# Patient Record
Sex: FEMALE | Race: OTHER | HISPANIC OR LATINO | URBAN - METROPOLITAN AREA
[De-identification: names, ages, dates, MRNs, and addresses within clinical notes are randomized per-mention and may not be internally consistent; named-entity substitution may affect disease eponyms.]

---

## 2017-07-09 ENCOUNTER — EMERGENCY (EMERGENCY)
Facility: HOSPITAL | Age: 47
LOS: 0 days | Discharge: HOME | End: 2017-07-10
Attending: EMERGENCY MEDICINE | Admitting: EMERGENCY MEDICINE
Payer: MEDICAID

## 2017-07-09 VITALS
TEMPERATURE: 98 F | SYSTOLIC BLOOD PRESSURE: 133 MMHG | DIASTOLIC BLOOD PRESSURE: 89 MMHG | OXYGEN SATURATION: 99 % | HEART RATE: 78 BPM | RESPIRATION RATE: 20 BRPM

## 2017-07-09 DIAGNOSIS — M54.5 LOW BACK PAIN: ICD-10-CM

## 2017-07-09 LAB
ALBUMIN SERPL ELPH-MCNC: 3.7 G/DL — SIGNIFICANT CHANGE UP (ref 3.3–5)
ALP SERPL-CCNC: 68 U/L — SIGNIFICANT CHANGE UP (ref 40–120)
ALT FLD-CCNC: 27 U/L — SIGNIFICANT CHANGE UP (ref 12–78)
ANION GAP SERPL CALC-SCNC: 5 MMOL/L — SIGNIFICANT CHANGE UP (ref 5–17)
APPEARANCE UR: CLEAR — SIGNIFICANT CHANGE UP
AST SERPL-CCNC: 19 U/L — SIGNIFICANT CHANGE UP (ref 15–37)
BASOPHILS # BLD AUTO: 0.1 K/UL — SIGNIFICANT CHANGE UP (ref 0–0.2)
BASOPHILS NFR BLD AUTO: 0.9 % — SIGNIFICANT CHANGE UP (ref 0–2)
BILIRUB SERPL-MCNC: 0.4 MG/DL — SIGNIFICANT CHANGE UP (ref 0.2–1.2)
BILIRUB UR-MCNC: NEGATIVE — SIGNIFICANT CHANGE UP
BUN SERPL-MCNC: 11 MG/DL — SIGNIFICANT CHANGE UP (ref 7–23)
CALCIUM SERPL-MCNC: 8.5 MG/DL — SIGNIFICANT CHANGE UP (ref 8.5–10.1)
CHLORIDE SERPL-SCNC: 103 MMOL/L — SIGNIFICANT CHANGE UP (ref 96–108)
CO2 SERPL-SCNC: 29 MMOL/L — SIGNIFICANT CHANGE UP (ref 22–31)
COLOR SPEC: YELLOW — SIGNIFICANT CHANGE UP
CREAT SERPL-MCNC: 0.78 MG/DL — SIGNIFICANT CHANGE UP (ref 0.5–1.3)
DIFF PNL FLD: (no result)
EOSINOPHIL # BLD AUTO: 0.4 K/UL — SIGNIFICANT CHANGE UP (ref 0–0.5)
EOSINOPHIL NFR BLD AUTO: 3.2 % — SIGNIFICANT CHANGE UP (ref 0–6)
EPI CELLS # UR: SIGNIFICANT CHANGE UP
GLUCOSE SERPL-MCNC: 114 MG/DL — HIGH (ref 70–99)
GLUCOSE UR QL: NEGATIVE MG/DL — SIGNIFICANT CHANGE UP
HCT VFR BLD CALC: 41 % — SIGNIFICANT CHANGE UP (ref 34.5–45)
HGB BLD-MCNC: 13.8 G/DL — SIGNIFICANT CHANGE UP (ref 11.5–15.5)
KETONES UR-MCNC: NEGATIVE — SIGNIFICANT CHANGE UP
LEUKOCYTE ESTERASE UR-ACNC: NEGATIVE — SIGNIFICANT CHANGE UP
LYMPHOCYTES # BLD AUTO: 38.2 % — SIGNIFICANT CHANGE UP (ref 13–44)
LYMPHOCYTES # BLD AUTO: 5.3 K/UL — HIGH (ref 1–3.3)
MANUAL DIF COMMENT BLD-IMP: SIGNIFICANT CHANGE UP
MCHC RBC-ENTMCNC: 29.9 PG — SIGNIFICANT CHANGE UP (ref 27–34)
MCHC RBC-ENTMCNC: 33.7 GM/DL — SIGNIFICANT CHANGE UP (ref 32–36)
MCV RBC AUTO: 88.6 FL — SIGNIFICANT CHANGE UP (ref 80–100)
MONOCYTES # BLD AUTO: 1 K/UL — HIGH (ref 0–0.9)
MONOCYTES NFR BLD AUTO: 7.2 % — SIGNIFICANT CHANGE UP (ref 2–14)
NEUTROPHILS # BLD AUTO: 7 K/UL — SIGNIFICANT CHANGE UP (ref 1.8–7.4)
NEUTROPHILS NFR BLD AUTO: 50.5 % — SIGNIFICANT CHANGE UP (ref 43–77)
NITRITE UR-MCNC: NEGATIVE — SIGNIFICANT CHANGE UP
PH UR: 6 — SIGNIFICANT CHANGE UP (ref 5–8)
PLAT MORPH BLD: NORMAL — SIGNIFICANT CHANGE UP
PLATELET # BLD AUTO: 275 K/UL — SIGNIFICANT CHANGE UP (ref 150–400)
POTASSIUM SERPL-MCNC: 4.2 MMOL/L — SIGNIFICANT CHANGE UP (ref 3.5–5.3)
POTASSIUM SERPL-SCNC: 4.2 MMOL/L — SIGNIFICANT CHANGE UP (ref 3.5–5.3)
PROT SERPL-MCNC: 7.6 GM/DL — SIGNIFICANT CHANGE UP (ref 6–8.3)
PROT UR-MCNC: NEGATIVE MG/DL — SIGNIFICANT CHANGE UP
RBC # BLD: 4.63 M/UL — SIGNIFICANT CHANGE UP (ref 3.8–5.2)
RBC # FLD: 12.5 % — SIGNIFICANT CHANGE UP (ref 10.3–14.5)
RBC BLD AUTO: NORMAL — SIGNIFICANT CHANGE UP
RBC CASTS # UR COMP ASSIST: SIGNIFICANT CHANGE UP /HPF (ref 0–4)
SODIUM SERPL-SCNC: 137 MMOL/L — SIGNIFICANT CHANGE UP (ref 135–145)
SP GR SPEC: 1.01 — SIGNIFICANT CHANGE UP (ref 1.01–1.02)
UROBILINOGEN FLD QL: NEGATIVE MG/DL — SIGNIFICANT CHANGE UP
WBC # BLD: 13.8 K/UL — HIGH (ref 3.8–10.5)
WBC # FLD AUTO: 13.8 K/UL — HIGH (ref 3.8–10.5)
WBC UR QL: SIGNIFICANT CHANGE UP

## 2017-07-09 PROCEDURE — 99285 EMERGENCY DEPT VISIT HI MDM: CPT

## 2017-07-09 PROCEDURE — 72100 X-RAY EXAM L-S SPINE 2/3 VWS: CPT | Mod: 26

## 2017-07-09 RX ORDER — KETOROLAC TROMETHAMINE 30 MG/ML
30 SYRINGE (ML) INJECTION ONCE
Qty: 0 | Refills: 0 | Status: DISCONTINUED | OUTPATIENT
Start: 2017-07-09 | End: 2017-07-09

## 2017-07-09 RX ORDER — SODIUM CHLORIDE 9 MG/ML
1000 INJECTION INTRAMUSCULAR; INTRAVENOUS; SUBCUTANEOUS ONCE
Qty: 0 | Refills: 0 | Status: COMPLETED | OUTPATIENT
Start: 2017-07-09 | End: 2017-07-09

## 2017-07-09 RX ADMIN — Medication 30 MILLIGRAM(S): at 22:22

## 2017-07-09 RX ADMIN — SODIUM CHLORIDE 1000 MILLILITER(S): 9 INJECTION INTRAMUSCULAR; INTRAVENOUS; SUBCUTANEOUS at 22:22

## 2017-07-09 NOTE — ED STATDOCS - OBJECTIVE STATEMENT
46 y/o F with Hx of hysterectomy, pyelonephritis 1 year ago presents to ED for evaluation of sudden onset lower back pain. Pt states pain makes it difficult to move. No recent trauma or exertion. No fever, chills n/v/d, dysuria.

## 2017-07-10 VITALS
SYSTOLIC BLOOD PRESSURE: 117 MMHG | OXYGEN SATURATION: 99 % | DIASTOLIC BLOOD PRESSURE: 83 MMHG | HEART RATE: 62 BPM | RESPIRATION RATE: 17 BRPM

## 2017-07-10 DIAGNOSIS — Z90.710 ACQUIRED ABSENCE OF BOTH CERVIX AND UTERUS: Chronic | ICD-10-CM

## 2017-07-10 PROCEDURE — 74176 CT ABD & PELVIS W/O CONTRAST: CPT | Mod: 26

## 2017-07-10 NOTE — ED ADULT NURSE NOTE - CHIEF COMPLAINT QUOTE
back pain that started at 12:00. Denies injury or trauma. Initially reporting chest pain, but denies pain now. States that pain is so bad it is 'hard to breathe." No respiratory distress evident.

## 2017-07-10 NOTE — ED ADULT NURSE NOTE - ED STAT RN HANDOFF DETAILS
Received report from JOANN Gallegos and reassessed patient. Agree with the previous RN assessment. Patient is awaiting CT scan results. Patient is sleeping, no change in status/condition at this time. Will continue to monitor/reassess.

## 2017-07-11 LAB
CULTURE RESULTS: SIGNIFICANT CHANGE UP
SPECIMEN SOURCE: SIGNIFICANT CHANGE UP

## 2017-11-25 ENCOUNTER — EMERGENCY (EMERGENCY)
Facility: HOSPITAL | Age: 47
LOS: 0 days | Discharge: HOME | End: 2017-11-26
Attending: EMERGENCY MEDICINE | Admitting: EMERGENCY MEDICINE
Payer: MEDICAID

## 2017-11-25 VITALS — WEIGHT: 179.9 LBS | HEIGHT: 63 IN

## 2017-11-25 DIAGNOSIS — Z90.710 ACQUIRED ABSENCE OF BOTH CERVIX AND UTERUS: Chronic | ICD-10-CM

## 2017-11-25 PROCEDURE — 99283 EMERGENCY DEPT VISIT LOW MDM: CPT | Mod: 25

## 2017-11-26 VITALS
TEMPERATURE: 99 F | DIASTOLIC BLOOD PRESSURE: 72 MMHG | OXYGEN SATURATION: 99 % | SYSTOLIC BLOOD PRESSURE: 115 MMHG | HEART RATE: 80 BPM

## 2017-11-26 LAB
APPEARANCE UR: (no result)
BACTERIA # UR AUTO: (no result)
BILIRUB UR-MCNC: NEGATIVE — SIGNIFICANT CHANGE UP
COLOR SPEC: YELLOW — SIGNIFICANT CHANGE UP
DIFF PNL FLD: (no result)
EPI CELLS # UR: SIGNIFICANT CHANGE UP
GLUCOSE UR QL: NEGATIVE MG/DL — SIGNIFICANT CHANGE UP
KETONES UR-MCNC: NEGATIVE — SIGNIFICANT CHANGE UP
LEUKOCYTE ESTERASE UR-ACNC: (no result)
NITRITE UR-MCNC: NEGATIVE — SIGNIFICANT CHANGE UP
PH UR: 7 — SIGNIFICANT CHANGE UP (ref 5–8)
PROT UR-MCNC: 15 MG/DL
RBC CASTS # UR COMP ASSIST: SIGNIFICANT CHANGE UP /HPF (ref 0–4)
SP GR SPEC: 1.01 — SIGNIFICANT CHANGE UP (ref 1.01–1.02)
UROBILINOGEN FLD QL: 1 MG/DL
WBC UR QL: SIGNIFICANT CHANGE UP

## 2017-11-26 RX ORDER — KETOROLAC TROMETHAMINE 30 MG/ML
30 SYRINGE (ML) INJECTION ONCE
Qty: 0 | Refills: 0 | Status: DISCONTINUED | OUTPATIENT
Start: 2017-11-26 | End: 2017-11-26

## 2017-11-26 RX ADMIN — Medication 30 MILLIGRAM(S): at 02:12

## 2017-11-26 NOTE — ED PROVIDER NOTE - MUSCULOSKELETAL, MLM
No midline spine tenderness.  Right lower back/ SI tender.  FROM but with pain to range RLE.  Neg SLR.  Ambulatory but difficulty standing up all the way secondary to pain.  Strength 5/5.  Sensation intact

## 2017-11-26 NOTE — ED PROVIDER NOTE - OBJECTIVE STATEMENT
History obtained via  [id number]  46 yo female c/o low back pain. Pain is right sided, mostly upper buttock with occasional radiation down the right leg.  Pain started around 10am after bending over and having difficulty standing back up.  Took two tylenol at 10am without relief.  Noticed the numbness a couple of hours later, pins/needles in the leg, now resolved.  No fever. No abdominal pain. No weakness.  +difficulty walking secondary to pain.  No bowel/urinary complaints.  Had a similar episode over the summer, not as bad, resolved on its own.  No pmh.  PMD Griselda

## 2017-11-26 NOTE — ED PROVIDER NOTE - CONSTITUTIONAL, MLM
normal... Well appearing, well nourished, awake, alert, oriented to person, place, time/situation and in no apparent distress at rest, moderately uncomfortable with movement/standing

## 2017-11-26 NOTE — ED ADULT NURSE NOTE - OBJECTIVE STATEMENT
Pt presents to ER c/o right sided back pain radiating down RLE. Pt reports onset of symptoms began at 10 am yesterday while walking and became exacerbated at night. Pt reports hx of back pain. Pt reports not being able to walk up stairs. Denies fall/trauma. Skin is intact, no ecchymosis/erythremia. Pt reports pain shooting down RLE with ambulation. AO x 3 oriented to baseline, normal breathing pattern with no difficulty

## 2017-11-27 DIAGNOSIS — M54.5 LOW BACK PAIN: ICD-10-CM

## 2018-09-21 ENCOUNTER — EMERGENCY (EMERGENCY)
Facility: HOSPITAL | Age: 48
LOS: 0 days | Discharge: HOME | End: 2018-09-21
Attending: EMERGENCY MEDICINE | Admitting: EMERGENCY MEDICINE
Payer: MEDICAID

## 2018-09-21 VITALS
DIASTOLIC BLOOD PRESSURE: 83 MMHG | TEMPERATURE: 99 F | SYSTOLIC BLOOD PRESSURE: 117 MMHG | RESPIRATION RATE: 15 BRPM | HEART RATE: 75 BPM | OXYGEN SATURATION: 98 %

## 2018-09-21 VITALS — WEIGHT: 199.08 LBS | HEIGHT: 63 IN

## 2018-09-21 DIAGNOSIS — M25.512 PAIN IN LEFT SHOULDER: ICD-10-CM

## 2018-09-21 DIAGNOSIS — M79.622 PAIN IN LEFT UPPER ARM: ICD-10-CM

## 2018-09-21 DIAGNOSIS — Z90.710 ACQUIRED ABSENCE OF BOTH CERVIX AND UTERUS: Chronic | ICD-10-CM

## 2018-09-21 PROCEDURE — 99283 EMERGENCY DEPT VISIT LOW MDM: CPT

## 2018-09-21 RX ORDER — IBUPROFEN 200 MG
600 TABLET ORAL ONCE
Qty: 0 | Refills: 0 | Status: COMPLETED | OUTPATIENT
Start: 2018-09-21 | End: 2018-09-21

## 2018-09-21 RX ORDER — IBUPROFEN 200 MG
1 TABLET ORAL
Qty: 20 | Refills: 0
Start: 2018-09-21 | End: 2018-09-25

## 2018-09-21 RX ORDER — LIDOCAINE 4 G/100G
1 CREAM TOPICAL
Qty: 5 | Refills: 0
Start: 2018-09-21 | End: 2018-09-25

## 2018-09-21 NOTE — ED STATDOCS - CONSTITUTIONAL, MLM
Bronchospasm (Child)    When your child breathes, the air goes down his or her main windpipe (trachea) and through the bronchi into the lungs. The bronchi are the 2 tubes that lead from the trachea to the left and right lungs. If the bronchi get irritated and inflamed, they can narrow. This is because the muscles around the air passages in the lung go into spasm. This makes it hard to breathe. This condition is called bronchospasm.  Bronchospasm can be caused by many things. These include allergies, asthma, a respiratory infection, or reaction to a medication.  Bronchospasm makes it hard to breathe out. It causes wheezing when exhaling. Wheezing is a whistling sound caused by breathing through narrowed airways. Bronchospasm can also cause frequent coughing without the wheezing sound. A child with bronchospasm may cough, wheeze, or be short of breath. The inflamed area creates mucus. The mucus can partially block the airways. The chest muscles can tighten. The child can also have a fever.  A child with bronchospasm may be given medicine to take at home. A child with severe bronchospasm may need to stay in the hospital for 1 or more nights. He or she is given intravenous (IV) fluids and oxygen.  Children with asthma often get bronchospasm. But not all children with bronchospasm have asthma. If a child has repeated bouts of bronchospasm, then he or she may need to be tested for asthma.  Home care  Follow these guidelines when caring for your child at home:  · Your child's health care provider may prescribe medicines. Follow all instructions for giving these to your child. Don’t give your child any medicines that have not been approved by the provider. Your child may be prescribed bronchodilator medicine. This is to help with breathing. It may come as a spray, inhaler, or pill to take by mouth. Make sure your child uses the medicine exactly at the times advised. Follow all instructions for giving these medicines to  your child.  · Don’t give a child under age 6 cough or cold medicine unless the health care provider tells you to do so.  · Know the warning signs of a bronchospasm attack. These include irritability, restless sleep, fever, and cough. Your child may have no interest in feeding. Learn what medicines to give if you see these signs.  · Wash your hands well with soap and warm water before and after caring for your child. This is to help prevent spreading infection.  · Give your child plenty of time to rest. Have your child sleep in a slightly upright position. This is to help make breathing easier. If possible, raise the head of the mattress a few inches. Or prop your child’s body up with pillows. Don’t put pillows or other soft objects in the crib of babies under 12 months of age.  · To prevent dehydration and help loosen lung mucus in toddlers and older children, make sure your child drinks plenty of liquids. Children may prefer cold drinks, frozen desserts, or popsicles. They may also like warm chicken soup or drinks with lemon and honey. Don’t give honey to a child younger than 1 year old.  ·  To prevent dehydration and help loosen lung mucus in babies, make sure your child drinks plenty of liquids. Use a medicine dropper, if needed, to give small amounts of breast milk, formula, or clear liquids to your baby. Give 1 to 2 teaspoons every 10 to 15 minutes. A baby may only be able to feed for short amounts of time. If you are breastfeeding, pump and store milk for later use. Give your child oral rehydration solution between feedings. These are available from the drug store.  · Don’t smoke around your child. Tobacco smoke can make your child’s symptoms worse.  Follow-up care   Follow up with your child’s health care provider.  Special note to parents  Don’t give cough and cold medicines to any child under age 6. These have been shown to not help young children, and may cause serious side effects.  When to seek medical  advice  Call your child's health care provider right away if any of these occur:  · Fever of 100.4°F (38°C) or higher  · No improvement within 24 hours of treatment  · Symptoms that don’t get better, or get worse  · Cough with lots of thick colored mucus  · Trouble breathing that doesn’t get better, or gets worse  · Fast breathing  · Loss of appetite or poor feeding  · Signs of dehydration, such as dry mouth, crying with no tears, or urinating less than normal  · More medicine than prescribed is needed to help relieve wheezing  · The medicine doesn’t relieve wheezing  © 9279-7983 [employer]. [address] [place] [address], [place] [address]. All rights reserved. This information is not intended as a substitute for professional medical care. Always follow your healthcare professional's instructions.         normal... well appearing, well nourished, and in no apparent distress.

## 2018-09-21 NOTE — ED STATDOCS - OBJECTIVE STATEMENT
47 y/o female with a PMHx of Pyelonephritis presents to the ED c/o left arm pain x 4 days. Pt has difficulty lifting the arm due to pain. Has not taken any medication for the pain. PCP: Dr. Olivo

## 2018-09-21 NOTE — ED STATDOCS - ATTENDING CONTRIBUTION TO CARE
I, Dillan Zapata, performed the initial face to face bedside interview with this patient regarding history of present illness, review of symptoms and relevant past medical, social and family history.  I completed an independent physical examination.  I was the initial provider who evaluated this patient.  The history, relevant review of systems, past medical and surgical history, medical decision making, and physical examination was documented by the scribe in my presence and I attest to the accuracy of the documentation.  I have signed out the follow up of any pending tests (i.e. labs, radiological studies) to the ACP.  I have communicated the patient’s plan of care and disposition with the ACP.

## 2018-09-21 NOTE — ED ADULT TRIAGE NOTE - CHIEF COMPLAINT QUOTE
Patient comes to ED for left sided arm, breast and back pain for 4 days. Pt denies any injury. pt reports pain worsening today and difficulty moving arm

## 2018-09-22 PROBLEM — N12 TUBULO-INTERSTITIAL NEPHRITIS, NOT SPECIFIED AS ACUTE OR CHRONIC: Chronic | Status: ACTIVE | Noted: 2017-07-10

## 2019-03-13 ENCOUNTER — APPOINTMENT (OUTPATIENT)
Age: 49
End: 2019-03-13
Payer: MEDICAID

## 2019-03-13 ENCOUNTER — ASOB RESULT (OUTPATIENT)
Age: 49
End: 2019-03-13

## 2019-03-13 PROCEDURE — 76857 US EXAM PELVIC LIMITED: CPT | Mod: 59

## 2019-03-13 PROCEDURE — 76830 TRANSVAGINAL US NON-OB: CPT

## 2019-06-10 ENCOUNTER — INPATIENT (INPATIENT)
Facility: HOSPITAL | Age: 49
LOS: 0 days | Discharge: HOME | End: 2019-06-11
Attending: FAMILY MEDICINE | Admitting: FAMILY MEDICINE
Payer: MEDICAID

## 2019-06-10 VITALS — HEIGHT: 62 IN | WEIGHT: 186.07 LBS

## 2019-06-10 DIAGNOSIS — Z90.710 ACQUIRED ABSENCE OF BOTH CERVIX AND UTERUS: Chronic | ICD-10-CM

## 2019-06-10 LAB
ALBUMIN SERPL ELPH-MCNC: 3.8 G/DL — SIGNIFICANT CHANGE UP (ref 3.3–5)
ALP SERPL-CCNC: 69 U/L — SIGNIFICANT CHANGE UP (ref 40–120)
ALT FLD-CCNC: 36 U/L — SIGNIFICANT CHANGE UP (ref 12–78)
ANION GAP SERPL CALC-SCNC: 5 MMOL/L — SIGNIFICANT CHANGE UP (ref 5–17)
APPEARANCE UR: CLEAR — SIGNIFICANT CHANGE UP
APTT BLD: 33.2 SEC — SIGNIFICANT CHANGE UP (ref 27.5–36.3)
AST SERPL-CCNC: 29 U/L — SIGNIFICANT CHANGE UP (ref 15–37)
BACTERIA # UR AUTO: ABNORMAL
BILIRUB SERPL-MCNC: 0.4 MG/DL — SIGNIFICANT CHANGE UP (ref 0.2–1.2)
BILIRUB UR-MCNC: NEGATIVE — SIGNIFICANT CHANGE UP
BUN SERPL-MCNC: 13 MG/DL — SIGNIFICANT CHANGE UP (ref 7–23)
CALCIUM SERPL-MCNC: 9.3 MG/DL — SIGNIFICANT CHANGE UP (ref 8.5–10.1)
CHLORIDE SERPL-SCNC: 103 MMOL/L — SIGNIFICANT CHANGE UP (ref 96–108)
CO2 SERPL-SCNC: 28 MMOL/L — SIGNIFICANT CHANGE UP (ref 22–31)
COLOR SPEC: YELLOW — SIGNIFICANT CHANGE UP
CREAT SERPL-MCNC: 0.79 MG/DL — SIGNIFICANT CHANGE UP (ref 0.5–1.3)
DIFF PNL FLD: ABNORMAL
EPI CELLS # UR: SIGNIFICANT CHANGE UP
GLUCOSE SERPL-MCNC: 123 MG/DL — HIGH (ref 70–99)
GLUCOSE UR QL: NEGATIVE MG/DL — SIGNIFICANT CHANGE UP
HCT VFR BLD CALC: 41.1 % — SIGNIFICANT CHANGE UP (ref 34.5–45)
HGB BLD-MCNC: 13.6 G/DL — SIGNIFICANT CHANGE UP (ref 11.5–15.5)
INR BLD: 1.08 RATIO — SIGNIFICANT CHANGE UP (ref 0.88–1.16)
KETONES UR-MCNC: NEGATIVE — SIGNIFICANT CHANGE UP
LEUKOCYTE ESTERASE UR-ACNC: NEGATIVE — SIGNIFICANT CHANGE UP
LIDOCAIN IGE QN: 229 U/L — SIGNIFICANT CHANGE UP (ref 73–393)
MCHC RBC-ENTMCNC: 29 PG — SIGNIFICANT CHANGE UP (ref 27–34)
MCHC RBC-ENTMCNC: 33.1 GM/DL — SIGNIFICANT CHANGE UP (ref 32–36)
MCV RBC AUTO: 87.6 FL — SIGNIFICANT CHANGE UP (ref 80–100)
NITRITE UR-MCNC: NEGATIVE — SIGNIFICANT CHANGE UP
PH UR: 6 — SIGNIFICANT CHANGE UP (ref 5–8)
PLATELET # BLD AUTO: 238 K/UL — SIGNIFICANT CHANGE UP (ref 150–400)
POTASSIUM SERPL-MCNC: 3.9 MMOL/L — SIGNIFICANT CHANGE UP (ref 3.5–5.3)
POTASSIUM SERPL-SCNC: 3.9 MMOL/L — SIGNIFICANT CHANGE UP (ref 3.5–5.3)
PROT SERPL-MCNC: 8 GM/DL — SIGNIFICANT CHANGE UP (ref 6–8.3)
PROT UR-MCNC: NEGATIVE MG/DL — SIGNIFICANT CHANGE UP
PROTHROM AB SERPL-ACNC: 12 SEC — SIGNIFICANT CHANGE UP (ref 10–12.9)
RBC # BLD: 4.69 M/UL — SIGNIFICANT CHANGE UP (ref 3.8–5.2)
RBC # FLD: 12.3 % — SIGNIFICANT CHANGE UP (ref 10.3–14.5)
RBC CASTS # UR COMP ASSIST: ABNORMAL /HPF (ref 0–4)
SODIUM SERPL-SCNC: 136 MMOL/L — SIGNIFICANT CHANGE UP (ref 135–145)
SP GR SPEC: 1.01 — SIGNIFICANT CHANGE UP (ref 1.01–1.02)
TROPONIN I SERPL-MCNC: <0.015 NG/ML — SIGNIFICANT CHANGE UP (ref 0.01–0.04)
UROBILINOGEN FLD QL: NEGATIVE MG/DL — SIGNIFICANT CHANGE UP
WBC # BLD: 11.69 K/UL — HIGH (ref 3.8–10.5)
WBC # FLD AUTO: 11.69 K/UL — HIGH (ref 3.8–10.5)
WBC UR QL: SIGNIFICANT CHANGE UP

## 2019-06-10 PROCEDURE — 71045 X-RAY EXAM CHEST 1 VIEW: CPT | Mod: 26

## 2019-06-10 PROCEDURE — 99285 EMERGENCY DEPT VISIT HI MDM: CPT | Mod: 25

## 2019-06-10 PROCEDURE — 93010 ELECTROCARDIOGRAM REPORT: CPT | Mod: 76

## 2019-06-10 RX ORDER — ASPIRIN/CALCIUM CARB/MAGNESIUM 324 MG
325 TABLET ORAL ONCE
Refills: 0 | Status: COMPLETED | OUTPATIENT
Start: 2019-06-10 | End: 2019-06-10

## 2019-06-10 RX ADMIN — Medication 325 MILLIGRAM(S): at 23:16

## 2019-06-10 NOTE — ED ADULT TRIAGE NOTE - CHIEF COMPLAINT QUOTE
Complaining of chest pain. Saw PMD last Thursday and was placed on halter monitor x1day. States after that she was referred to a cardiologist for abnormal EKG, has an appointment this week. States tonight she began having CP again. Denies any SOB. Ambulated into ED with no distress noted at triage.

## 2019-06-10 NOTE — ED PROVIDER NOTE - OBJECTIVE STATEMENT
50 y/o female with a PMHx of pyelonephritis presents to the ED c/o chest pain x2 days. +difficulty breathing. +fatigue. Pt was seen by PMD for checkup last week, wore Holter monitor for 24 hours showed 4 episodes of ST depressions, multiple pauses, SVT. Pt is supposed to follow up with cardiology but has not seen cardio yet. PMD- Dr. Villalobos. 50 y/o female with a PMHx of pyelonephritis presents to the ED c/o chest pain x2 days. +difficulty breathing. +fatigue. Pt was seen by PMD for checkup last week, wore Holter monitor for 24 hours showed 4 episodes of ST depressions, multiple pauses, SVT. Pt is supposed to follow up with cardiology but has not seen cardio yet. pt denies any fever, HA, n/v/d/abd pain.   states no meds taken for pain.   no sick contacts or recent travel.  PMD- Dr. Villalobos.

## 2019-06-10 NOTE — ED PROVIDER NOTE - CHPI ED SYMPTOMS POS
+difficulty breathing, fatigue/CHEST PAIN +difficulty breathing, fatigue/SHORTNESS OF BREATH/CHEST PAIN

## 2019-06-10 NOTE — ED PROVIDER NOTE - PROGRESS NOTE DETAILS
results noted.   pt with abnormal Holter.   will admit for further ACS w/u.  case d/w Allie and will admit

## 2019-06-11 ENCOUNTER — TRANSCRIPTION ENCOUNTER (OUTPATIENT)
Age: 49
End: 2019-06-11

## 2019-06-11 VITALS
SYSTOLIC BLOOD PRESSURE: 94 MMHG | HEART RATE: 69 BPM | TEMPERATURE: 98 F | OXYGEN SATURATION: 95 % | RESPIRATION RATE: 18 BRPM | DIASTOLIC BLOOD PRESSURE: 64 MMHG

## 2019-06-11 LAB
ALBUMIN SERPL ELPH-MCNC: 3.6 G/DL — SIGNIFICANT CHANGE UP (ref 3.3–5)
ALP SERPL-CCNC: 69 U/L — SIGNIFICANT CHANGE UP (ref 40–120)
ALT FLD-CCNC: 36 U/L — SIGNIFICANT CHANGE UP (ref 12–78)
ANION GAP SERPL CALC-SCNC: 9 MMOL/L — SIGNIFICANT CHANGE UP (ref 5–17)
AST SERPL-CCNC: 25 U/L — SIGNIFICANT CHANGE UP (ref 15–37)
BASOPHILS # BLD AUTO: 0.05 K/UL — SIGNIFICANT CHANGE UP (ref 0–0.2)
BASOPHILS NFR BLD AUTO: 0.5 % — SIGNIFICANT CHANGE UP (ref 0–2)
BILIRUB SERPL-MCNC: 0.6 MG/DL — SIGNIFICANT CHANGE UP (ref 0.2–1.2)
BUN SERPL-MCNC: 9 MG/DL — SIGNIFICANT CHANGE UP (ref 7–23)
CALCIUM SERPL-MCNC: 8.8 MG/DL — SIGNIFICANT CHANGE UP (ref 8.5–10.1)
CHLORIDE SERPL-SCNC: 105 MMOL/L — SIGNIFICANT CHANGE UP (ref 96–108)
CHOLEST SERPL-MCNC: 142 MG/DL — SIGNIFICANT CHANGE UP (ref 10–199)
CO2 SERPL-SCNC: 26 MMOL/L — SIGNIFICANT CHANGE UP (ref 22–31)
CREAT SERPL-MCNC: 0.68 MG/DL — SIGNIFICANT CHANGE UP (ref 0.5–1.3)
D DIMER BLD IA.RAPID-MCNC: <150 NG/ML DDU — SIGNIFICANT CHANGE UP
EOSINOPHIL # BLD AUTO: 0.6 K/UL — HIGH (ref 0–0.5)
EOSINOPHIL NFR BLD AUTO: 6.4 % — HIGH (ref 0–6)
GLUCOSE BLDC GLUCOMTR-MCNC: 110 MG/DL — HIGH (ref 70–99)
GLUCOSE BLDC GLUCOMTR-MCNC: 120 MG/DL — HIGH (ref 70–99)
GLUCOSE SERPL-MCNC: 109 MG/DL — HIGH (ref 70–99)
HBA1C BLD-MCNC: 6.4 % — HIGH (ref 4–5.6)
HCT VFR BLD CALC: 40.1 % — SIGNIFICANT CHANGE UP (ref 34.5–45)
HDLC SERPL-MCNC: 43 MG/DL — LOW
HGB BLD-MCNC: 13.2 G/DL — SIGNIFICANT CHANGE UP (ref 11.5–15.5)
IMM GRANULOCYTES NFR BLD AUTO: 0.3 % — SIGNIFICANT CHANGE UP (ref 0–1.5)
LIPID PNL WITH DIRECT LDL SERPL: 71 MG/DL — SIGNIFICANT CHANGE UP
LYMPHOCYTES # BLD AUTO: 4.08 K/UL — HIGH (ref 1–3.3)
LYMPHOCYTES # BLD AUTO: 43.5 % — SIGNIFICANT CHANGE UP (ref 13–44)
MCHC RBC-ENTMCNC: 29.3 PG — SIGNIFICANT CHANGE UP (ref 27–34)
MCHC RBC-ENTMCNC: 32.9 GM/DL — SIGNIFICANT CHANGE UP (ref 32–36)
MCV RBC AUTO: 89.1 FL — SIGNIFICANT CHANGE UP (ref 80–100)
MONOCYTES # BLD AUTO: 0.78 K/UL — SIGNIFICANT CHANGE UP (ref 0–0.9)
MONOCYTES NFR BLD AUTO: 8.3 % — SIGNIFICANT CHANGE UP (ref 2–14)
NEUTROPHILS # BLD AUTO: 3.85 K/UL — SIGNIFICANT CHANGE UP (ref 1.8–7.4)
NEUTROPHILS NFR BLD AUTO: 41 % — LOW (ref 43–77)
PLATELET # BLD AUTO: 229 K/UL — SIGNIFICANT CHANGE UP (ref 150–400)
POTASSIUM SERPL-MCNC: 3.8 MMOL/L — SIGNIFICANT CHANGE UP (ref 3.5–5.3)
POTASSIUM SERPL-SCNC: 3.8 MMOL/L — SIGNIFICANT CHANGE UP (ref 3.5–5.3)
PROT SERPL-MCNC: 7.4 GM/DL — SIGNIFICANT CHANGE UP (ref 6–8.3)
RBC # BLD: 4.5 M/UL — SIGNIFICANT CHANGE UP (ref 3.8–5.2)
RBC # FLD: 12.4 % — SIGNIFICANT CHANGE UP (ref 10.3–14.5)
SODIUM SERPL-SCNC: 140 MMOL/L — SIGNIFICANT CHANGE UP (ref 135–145)
TOTAL CHOLESTEROL/HDL RATIO MEASUREMENT: 3.3 RATIO — SIGNIFICANT CHANGE UP (ref 3.3–7.1)
TRIGL SERPL-MCNC: 141 MG/DL — SIGNIFICANT CHANGE UP (ref 10–149)
TROPONIN I SERPL-MCNC: <0.015 NG/ML — SIGNIFICANT CHANGE UP (ref 0.01–0.04)
TROPONIN I SERPL-MCNC: <0.015 NG/ML — SIGNIFICANT CHANGE UP (ref 0.01–0.04)
WBC # BLD: 9.39 K/UL — SIGNIFICANT CHANGE UP (ref 3.8–10.5)
WBC # FLD AUTO: 9.39 K/UL — SIGNIFICANT CHANGE UP (ref 3.8–10.5)

## 2019-06-11 PROCEDURE — 93306 TTE W/DOPPLER COMPLETE: CPT | Mod: 26

## 2019-06-11 RX ORDER — DEXTROSE 50 % IN WATER 50 %
25 SYRINGE (ML) INTRAVENOUS ONCE
Refills: 0 | Status: DISCONTINUED | OUTPATIENT
Start: 2019-06-11 | End: 2019-06-11

## 2019-06-11 RX ORDER — GLUCAGON INJECTION, SOLUTION 0.5 MG/.1ML
1 INJECTION, SOLUTION SUBCUTANEOUS ONCE
Refills: 0 | Status: DISCONTINUED | OUTPATIENT
Start: 2019-06-11 | End: 2019-06-11

## 2019-06-11 RX ORDER — ASPIRIN/CALCIUM CARB/MAGNESIUM 324 MG
1 TABLET ORAL
Qty: 30 | Refills: 0
Start: 2019-06-11 | End: 2019-07-10

## 2019-06-11 RX ORDER — INSULIN LISPRO 100/ML
VIAL (ML) SUBCUTANEOUS
Refills: 0 | Status: DISCONTINUED | OUTPATIENT
Start: 2019-06-11 | End: 2019-06-11

## 2019-06-11 RX ORDER — METOPROLOL TARTRATE 50 MG
1 TABLET ORAL
Qty: 30 | Refills: 0
Start: 2019-06-11 | End: 2019-07-10

## 2019-06-11 RX ORDER — METFORMIN HYDROCHLORIDE 850 MG/1
1 TABLET ORAL
Qty: 0 | Refills: 0 | DISCHARGE

## 2019-06-11 RX ORDER — SODIUM CHLORIDE 9 MG/ML
1000 INJECTION, SOLUTION INTRAVENOUS
Refills: 0 | Status: DISCONTINUED | OUTPATIENT
Start: 2019-06-11 | End: 2019-06-11

## 2019-06-11 RX ORDER — DEXTROSE 50 % IN WATER 50 %
12.5 SYRINGE (ML) INTRAVENOUS ONCE
Refills: 0 | Status: DISCONTINUED | OUTPATIENT
Start: 2019-06-11 | End: 2019-06-11

## 2019-06-11 RX ORDER — ATORVASTATIN CALCIUM 80 MG/1
1 TABLET, FILM COATED ORAL
Qty: 30 | Refills: 0
Start: 2019-06-11 | End: 2019-07-10

## 2019-06-11 RX ORDER — DEXTROSE 50 % IN WATER 50 %
15 SYRINGE (ML) INTRAVENOUS ONCE
Refills: 0 | Status: DISCONTINUED | OUTPATIENT
Start: 2019-06-11 | End: 2019-06-11

## 2019-06-11 RX ORDER — ASPIRIN/CALCIUM CARB/MAGNESIUM 324 MG
81 TABLET ORAL DAILY
Refills: 0 | Status: DISCONTINUED | OUTPATIENT
Start: 2019-06-11 | End: 2019-06-11

## 2019-06-11 RX ADMIN — Medication 81 MILLIGRAM(S): at 11:52

## 2019-06-11 NOTE — CONSULT NOTE ADULT - SUBJECTIVE AND OBJECTIVE BOX
Chief complaint of CP (2019 02:00)      HPI:  48 y/o F w/ PMH of DM2, p/w chest pain. CP started about 2 days ago, L sided and non-radiating. CP is associated with SOB and a feeling of throat closing. CP is sharp and is associated with nausea. Denies vomiting, cough, runny nose, fever, chills, abdominal pain. Patient had holter monitor placed recently, and has report with her. Report states patient has ventricular and supraventricular ectopics, pauses, ST depression etc.     PSH:     Social Hx: Denies x 3     Family Hx: Denies (2019 02:00)      PAST MEDICAL & SURGICAL HISTORY:  Pyelonephritis  History of hysterectomy      PREVIOUS CARDIAC WORKUP:      Echo:  Stress Test:  Cardiac Cath:    ALLERGIES:    No Known Allergies       MEDICATIONS  (STANDING):  aspirin  chewable 81 milliGRAM(s) Oral daily  dextrose 5%. 1000 milliLiter(s) (50 mL/Hr) IV Continuous <Continuous>  dextrose 50% Injectable 12.5 Gram(s) IV Push once  dextrose 50% Injectable 25 Gram(s) IV Push once  dextrose 50% Injectable 25 Gram(s) IV Push once  insulin lispro (HumaLOG) corrective regimen sliding scale   SubCutaneous three times a day before meals    MEDICATIONS  (PRN):  dextrose 40% Gel 15 Gram(s) Oral once PRN Blood Glucose LESS THAN 70 milliGRAM(s)/deciliter  glucagon  Injectable 1 milliGRAM(s) IntraMuscular once PRN Glucose LESS THAN 70 milligrams/deciliter      FAMILY HISTORY:  No pertinent family history in first degree relatives        SOCIAL HISTORY:  .scl     ROS:     .ros    Vital Signs Last 24 Hrs  T(C): 36.1 (2019 06:02), Max: 36.6 (10 Ricardo 2019 22:16)  T(F): 97 (2019 06:02), Max: 97.8 (10 Ricardo 2019 22:16)  HR: 74 (2019 06:02) (59 - 74)  BP: 125/72 (2019 06:02) (111/75 - 125/72)  BP(mean): --  RR: 18 (2019 06:02) (17 - 19)  SpO2: 98% (2019 06:02) (95% - 99%)    I&O's Summary      PHYSICAL EXAM:    .phy      TELEMETRY:    ECG:    LABS:                          13.2   9.39  )-----------( 229      ( 2019 07:17 )             40.1         140  |  105  |  9   ----------------------------<  109<H>  3.8   |  26  |  0.68    Ca    8.8      2019 07:17    TPro  7.4  /  Alb  3.6  /  TBili  0.6  /  DBili  x   /  AST  25  /  ALT  36  /  AlkPhos  69   @ 07:17  Trop-I  <0.015     @ 02:06  Trop-I  <0.015    06-10 @ 22:38  Trop-I  <0.015      D Dimer  <150  @ 07:17    PT/INR - ( 10 Ricardo 2019 22:38 )   PT: 12.0 sec;   INR: 1.08 ratio         PTT - ( 10 Ricardo 2019 22:38 )  PTT:33.2 sec      RADIOLOGY & ADDITIONAL STUDIES: Chief complaint of CP (11 Jun 2019 02:00)      HPI:  49-year-old female admitted with complaints of left-sided non-radiating chest pain. Also complains of associated shortness of breath on exertion. Pain is associated with nausea. No complains of dyspepsia or dysphagia.       PAST MEDICAL & SURGICAL HISTORY:  Pyelonephritis  History of hysterectomy  DM        PREVIOUS CARDIAC WORKUP:  Holter monitor - PACs, PVCs, occasional short pauses    Echo Today:  Nml EF, Subaortic membrane. LVOT gradient., mild MR, moderate TR      ALLERGIES:    No Known Allergies       MEDICATIONS  (STANDING):  aspirin  chewable 81 milliGRAM(s) Oral daily  dextrose 5%. 1000 milliLiter(s) (50 mL/Hr) IV Continuous <Continuous>  dextrose 50% Injectable 12.5 Gram(s) IV Push once  dextrose 50% Injectable 25 Gram(s) IV Push once  dextrose 50% Injectable 25 Gram(s) IV Push once  insulin lispro (HumaLOG) corrective regimen sliding scale   SubCutaneous three times a day before meals    MEDICATIONS  (PRN):  dextrose 40% Gel 15 Gram(s) Oral once PRN Blood Glucose LESS THAN 70 milliGRAM(s)/deciliter  glucagon  Injectable 1 milliGRAM(s) IntraMuscular once PRN Glucose LESS THAN 70 milligrams/deciliter      FAMILY HISTORY:  No pertinent family history in first degree relatives        SOCIAL HISTORY:  Nonsmoker. No ETOH abuse. No illicit drugs.     ROS:     Detailed ten system ROS was performed and was negative except for history as eluded to above.    no fever  no chills  no nausea  no vomiting  no diarrhea  no constipation  no melena  no hematochezia  no chest pain - resolved  no palpitations  no sob at rest  + dyspnea on exertion  no cough  no wheezing  no anorexia  no headache  no dizziness  no syncope  no weakness  no myalgia  no dysuria  no polyuria  no hematuria     Vital Signs Last 24 Hrs  T(C): 36.1 (11 Jun 2019 06:02), Max: 36.6 (10 Ricardo 2019 22:16)  T(F): 97 (11 Jun 2019 06:02), Max: 97.8 (10 Ricardo 2019 22:16)  HR: 74 (11 Jun 2019 06:02) (59 - 74)  BP: 125/72 (11 Jun 2019 06:02) (111/75 - 125/72)  RR: 18 (11 Jun 2019 06:02) (17 - 19)  SpO2: 98% (11 Jun 2019 06:02) (95% - 99%)      PHYSICAL EXAM:    General:                Comfortable, AAO X 3, in no distress. Obese  HEENT:                  Atraumatic, PERRLA, EOMI, conjunctiva clear.   Neck:                     Supple, no adenopathy, no thyromegaly, no JVD, no bruit.  Back:                     Symmetric, non tender.  Chest:                    Clear, B/L symmetric air entry, no tachypnea  Heart:                     S1, S2 normal, no gallop, + murmur.  Abdomen:              Soft, non-tender, bowel sounds active. No palpable masses.  Extremities:           no cyanosis, no edema. Peripheral pulses normal.  Skin:                      Skin color, texture normal. No rashes.  Neurologic:            Grossly nonfocal.       TELEMETRY:  Normal sinus rhythm with no tachy or carlene events     ECG: NSR, no ST T changes of ischemia or infarction.     LABS:                          13.2   9.39  )-----------( 229      ( 11 Jun 2019 07:17 )             40.1     06-11    140  |  105  |  9   ----------------------------<  109<H>  3.8   |  26  |  0.68    Ca    8.8      11 Jun 2019 07:17    TPro  7.4  /  Alb  3.6  /  TBili  0.6  /  DBili  x   /  AST  25  /  ALT  36  /  AlkPhos  69  06-11      06-11 @ 07:17  Trop-I  <0.015    06-11 @ 02:06  Trop-I  <0.015    06-10 @ 22:38  Trop-I  <0.015      D Dimer  <150 06-11 @ 07:17    PT/INR - ( 10 Ricardo 2019 22:38 )   PT: 12.0 sec;   INR: 1.08 ratio    PTT - ( 10 Ricardo 2019 22:38 )  PTT:33.2 sec    RADIOLOGY & ADDITIONAL STUDIES:    Xray Chest 1 View-PORTABLE IMMEDIATE (06.10.19 @ 23:21) >  FINDINGS/IMPRESSION:  The lungs are clear.  No pleural abnormality is seen.  Cardiomediastinal silhouette is intact.

## 2019-06-11 NOTE — DISCHARGE NOTE PROVIDER - HOSPITAL COURSE
48 y/o F w/ PMH of DM2, p/w chest pain. CP started about 2 days ago, L sided and non-radiating. CP is associated with SOB and a feeling of throat closing. CP is sharp and is associated with nausea. Denies vomiting, cough, runny nose, fever, chills, abdominal pain. Patient had holter monitor placed recently, and has report with her. Report states patient has ventricular and supraventricular ectopics, pauses, ST depression etc. 48 y/o F w/ PMH of DM2, p/w chest pain.        6/11- no chest pain or SOb today        Constitutional: Well appearing    HEENT: Atraumatic, THEODORE, Normal, No congestion    Respiratory: Breath Sounds normal, no rhonchi/wheeze    Cardiovascular: N S1S2    Gastrointestinal: Abdomen soft, non tender, Bowel Ssounds present    Extremities: No edema, peripheral pulses present    Neurological: AAO x 3, no gross focal motor deficits    Breasts: Deferred    Genitourinary: deferred    Rectal: Deferred        A/P                 *s/p Chest pain -     No evidence of ACS -per cardio    echo showed normal EF and subaortic membrane without a significant gradiaent as per preliminary report by dr eckert    i discussed with dr eckert at length.Cardio cleared patient for discharge on asa81, statin and low dose BB    needs serial echo as outpatient for follow up    patient has appointment to see dr eckert 6/13 in office     i discusued with patient PMD Dr nighat rayo    D-dimer neg    -As per wells criteria for PE, patient is in low risk category         *Mild leukoytosis reactive, now resolved     no clinical signs of infection    CXR no PNA,     shows microscopic hematuria - recheck as outpatient    patient without dysuria or frequency or fever or flank pain            *DM2    resume metformin     HbA1c 6.4          discharge time spent 65mins     discussed with dr nighat eckert, patient and son renato who helped interpret in Slovak for me at bedside

## 2019-06-11 NOTE — DISCHARGE NOTE NURSING/CASE MANAGEMENT/SOCIAL WORK - NSDCDPATPORTLINK_GEN_ALL_CORE
You can access the [facility] Patient Portal, offered by [facility], by registering with the following website: [email or web address]

## 2019-06-11 NOTE — DISCHARGE NOTE PROVIDER - NSDCCPCAREPLAN_GEN_ALL_CORE_FT
PRINCIPAL DISCHARGE DIAGNOSIS  Diagnosis: Chest pain  Assessment and Plan of Treatment: follow up with dr eckert office 6/13/19 and follow up with dr nighat rayo within 1 week, continue asa, atorvastatin and metoprolol and metformin and all meds as per discharge med rec, you need serial follow ups of echo cardiogram as outpatient for subaortic membrane on echo

## 2019-06-11 NOTE — H&P ADULT - HISTORY OF PRESENT ILLNESS
50 y/o F w/ PMH of DM2, p/w chest pain. CP started about 2 days ago, L sided and non-radiating. CP is associated with SOB and a feeling of throat closing. CP is sharp and is associated with nausea. Denies vomiting, cough, runny nose, fever, chills, abdominal pain. Patient had holter monitor placed recently, and has report with her. Report states patient has ventricular and supraventricular ectopics, pauses, ST depression etc.     PSH:     Social Hx: Denies x 3     Family Hx: Denies

## 2019-06-11 NOTE — ED ADULT NURSE REASSESSMENT NOTE - NS ED NURSE REASSESS COMMENT FT1
pt received from previous RN Yokasta. AOX3, primarily Serbian speaking. denies pain. vss. no s/s of acute distress noted. pt pending admission orders. POC reviewed with pt, verbalizes understanding. call bell in reach. will continue to monitor

## 2019-06-11 NOTE — CONSULT NOTE ADULT - ASSESSMENT
Chest pain  Dyspnea  DM  Obesity    Suggest:    No ACS clinically  Needs further cardiac work up with a nuclear stress test. Can be done as out pt.  Treat with ASA, statins, ACEi / ARBs pr  Weight loss  Diet and lifestyle modifications suggested.  Increase exercise.

## 2019-06-11 NOTE — DISCHARGE NOTE PROVIDER - CARE PROVIDER_API CALL
Lilian Villalobos)  Medicine  [address]  [place] [address]  Phone: [phone]  Fax: [phone]  Follow Up Time:     Ana Lilia Shaikh)  Cardiology; Interventional Cardiology  [address] [place] [place] [address]  Phone: [phone]  Fax: [phone]  Follow Up Time:

## 2019-06-11 NOTE — H&P ADULT - ASSESSMENT
48 y/o F w/ PMH of DM2, p/w chest pain.    *Chest pain - R/o ACS  -Trend troponins   -Serial EKGs  -ASA  -Cardio consult  -Tele monitoring  -Echo  -D-dimer  -As per wells criteria for PE, patient is in low risk category     *Mild leukocytosis  -Repeat WBC in AM to check for improvement    *DM2  -Humalog ISS  -Diabetic diet  -Metformin held during hospitalization     *DVT ppx  -SCDs 48 y/o F w/ PMH of DM2, p/w chest pain.    *Chest pain - R/o ACS  -Trend troponins   -Serial EKGs  -ASA  -Cardio consult  -Tele monitoring  -EKG - abnormal ST changes - possible early repolarization?   -Echo  -D-dimer  -As per wells criteria for PE, patient is in low risk category     *Mild leukoytosis  -Repeat WBC in AM to check for improvement    *DM2  -Humalog ISS  -Diabetic diet  -Metformin held during hospitalization     *DVT ppx  -SCDs

## 2019-06-16 DIAGNOSIS — R07.9 CHEST PAIN, UNSPECIFIED: ICD-10-CM

## 2019-06-16 DIAGNOSIS — R31.21 ASYMPTOMATIC MICROSCOPIC HEMATURIA: ICD-10-CM

## 2019-06-16 DIAGNOSIS — E11.9 TYPE 2 DIABETES MELLITUS WITHOUT COMPLICATIONS: ICD-10-CM

## 2019-06-16 DIAGNOSIS — E66.9 OBESITY, UNSPECIFIED: ICD-10-CM

## 2020-03-15 NOTE — ED ADULT NURSE NOTE - NS ED NURSE DC INFO COMPLEXITY
Aspiration pneumonia of both lower lobes, unspecified aspiration pneumonia type Verbalized Understanding/Moderate: Comprehensive teaching

## 2020-12-02 ENCOUNTER — EMERGENCY (EMERGENCY)
Facility: HOSPITAL | Age: 50
LOS: 1 days | Discharge: HOME | End: 2020-12-02
Attending: EMERGENCY MEDICINE
Payer: COMMERCIAL

## 2020-12-02 VITALS
SYSTOLIC BLOOD PRESSURE: 170 MMHG | HEART RATE: 89 BPM | DIASTOLIC BLOOD PRESSURE: 100 MMHG | RESPIRATION RATE: 18 BRPM | OXYGEN SATURATION: 99 % | TEMPERATURE: 98 F

## 2020-12-02 VITALS
TEMPERATURE: 98 F | SYSTOLIC BLOOD PRESSURE: 182 MMHG | OXYGEN SATURATION: 98 % | HEART RATE: 88 BPM | HEIGHT: 62 IN | DIASTOLIC BLOOD PRESSURE: 112 MMHG | RESPIRATION RATE: 18 BRPM

## 2020-12-02 DIAGNOSIS — Z90.710 ACQUIRED ABSENCE OF BOTH CERVIX AND UTERUS: Chronic | ICD-10-CM

## 2020-12-02 PROCEDURE — 70450 CT HEAD/BRAIN W/O DYE: CPT | Mod: 26

## 2020-12-02 PROCEDURE — 70450 CT HEAD/BRAIN W/O DYE: CPT

## 2020-12-02 PROCEDURE — 72125 CT NECK SPINE W/O DYE: CPT | Mod: 26

## 2020-12-02 PROCEDURE — 72125 CT NECK SPINE W/O DYE: CPT

## 2020-12-02 PROCEDURE — 99053 MED SERV 10PM-8AM 24 HR FAC: CPT

## 2020-12-02 PROCEDURE — 99284 EMERGENCY DEPT VISIT MOD MDM: CPT | Mod: 25

## 2020-12-02 PROCEDURE — 70486 CT MAXILLOFACIAL W/O DYE: CPT | Mod: 26

## 2020-12-02 PROCEDURE — 70486 CT MAXILLOFACIAL W/O DYE: CPT

## 2020-12-02 PROCEDURE — 73130 X-RAY EXAM OF HAND: CPT | Mod: 26,RT

## 2020-12-02 PROCEDURE — 99285 EMERGENCY DEPT VISIT HI MDM: CPT

## 2020-12-02 PROCEDURE — 73130 X-RAY EXAM OF HAND: CPT

## 2020-12-02 RX ORDER — IBUPROFEN 200 MG
1 TABLET ORAL
Qty: 20 | Refills: 0
Start: 2020-12-02 | End: 2020-12-06

## 2020-12-02 RX ORDER — DIAZEPAM 5 MG
1 TABLET ORAL
Qty: 4 | Refills: 0
Start: 2020-12-02 | End: 2020-12-03

## 2020-12-02 RX ORDER — ACETAMINOPHEN 500 MG
650 TABLET ORAL ONCE
Refills: 0 | Status: COMPLETED | OUTPATIENT
Start: 2020-12-02 | End: 2020-12-02

## 2020-12-02 RX ORDER — DIAZEPAM 5 MG
5 TABLET ORAL ONCE
Refills: 0 | Status: DISCONTINUED | OUTPATIENT
Start: 2020-12-02 | End: 2020-12-02
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
## 2020-12-02 NOTE — ED PROVIDER NOTE - PATIENT PORTAL LINK FT
You can access the FollowMyHealth Patient Portal offered by [facility] by registering at the following website: [email or web address]. By joining [employer]’s FollowMyHealth portal, you will also be able to view your health information using other applications (apps) compatible with our system.

## 2020-12-02 NOTE — ED ADULT NURSE NOTE - NSIMPLEMENTINTERV_GEN_ALL_ED
Implemented All Universal Safety Interventions:  [place] to call system. Call bell, personal items and telephone within reach. Instruct patient to call for assistance. Room bathroom lighting operational. Non-slip footwear when patient is off stretcher. Physically safe environment: no spills, clutter or unnecessary equipment. Stretcher in lowest position, wheels locked, appropriate side rails in place.
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
## 2020-12-02 NOTE — ED ADULT NURSE NOTE - PSH
History of hysterectomy     REVIEW OF SYSTEMS/VITAL SIGNS( Pullset)/DISPOSITION/PHYSICAL EXAM/PAST MEDICAL/SURGICAL/SOCIAL HISTORY/HISTORY OF PRESENT ILLNESS

## 2020-12-02 NOTE — ED PROVIDER NOTE - ADDITIONAL NOTES AND INSTRUCTIONS:
PT was evaluated At [facility] ED and was found to have a condition that warranted time of to rest and heal from WORK/SCHOOL.   Nico Augustine PA-C

## 2020-12-02 NOTE — ED PROVIDER NOTE - PROGRESS NOTE DETAILS
Nico Augustine PA-C:   WET READ RT WRIST: no acute fx or disclocation. pt informed of possibility of change in radiology read after official read, PT verbalizes that he understands results.

## 2020-12-02 NOTE — ED PROVIDER NOTE - NS ED ROS FT
ROS: CONTUSIONAL: Denies fever, chills, fatigue, wt loss. HEAD: Denies trauma, HA, Dizziness. EYE: Denies Acute visual changes, diplopia. ENMT: Denies change in hearing, tinnitus, epistaxis, difficulty swallowing, sore throat. CARDIO: Denies CP, palpitations, edema. RESP: Denies Cough, SOB , Diff breathing, hemoptysis. GI: Denies N/V, ABD pain, change in bowel movement. URINARY: Denies difficulty urinating, pelvic pain. MS:  +joint pain, back pain Denies , weakness, decreased ROM, swelling. NEURO: Denies change in gait, seizures, loss of sensation, dizziness, confusion LOC.  PSY: NO SI/HI.

## 2020-12-02 NOTE — ED PROVIDER NOTE - ATTENDING CONTRIBUTION TO CARE
I personally saw the patient with the PA, and completed the key components of the history and physical exam. I then discussed the management plan with the PA.   gen gcs 15 resp clear cardiac no murmur abd soft nd neuro intact msk as doceuemnted by pa   agree with pa plan of care

## 2020-12-02 NOTE — ED PROVIDER NOTE - CLINICAL SUMMARY MEDICAL DECISION MAKING FREE TEXT BOX
PT with stable VS, no acute distress, non toxic appearing, tolerating PO in the ED, Pt neuro vasc intact, no acute FX, pt to be dc home with follow up to PCP OTC pain meds, educated about when to return to the ED if needed. PT verbalizes that he understands all instructions and results. Pt informed that ED is open and available 24/7 365 days a yr, encouraged to return to the ED if they have any change in condition, or feel the need for revaluation.

## 2021-01-07 NOTE — ED ADULT TRIAGE NOTE - BSA (M2)
[address] RESIDENT NOTE       Date of evaluation: 2021    Chief Complaint     Fever, Shortness of Breath, and Cough      History of Present Illness     Ene Park is a 44 y.o. female who presents with 3 day hx of fever, cough, sob, loss of taste/smell. Patient works in a facility with covid patients. She began having symptoms 3 days ago and yesterday had a fever at which and was sent home. She has been having increased SMITH at home and has been using her inhaler more frequently. She also complains of diarrhea though this is chronic for her. Her cough is a dry cough. Her home pulse ox has been aroun 92% with exertion. Review of Systems     Review of Systems   Constitutional: Positive for chills, fatigue and fever. HENT: Negative for congestion. Loss of taste/smell   Respiratory: Positive for cough and shortness of breath. Negative for wheezing. Cardiovascular: Negative for chest pain and palpitations. Gastrointestinal: Positive for diarrhea. Negative for abdominal distention, abdominal pain, nausea and vomiting. Genitourinary: Negative for dysuria. Neurological: Positive for weakness. Negative for dizziness, syncope, light-headedness and numbness. Past Medical, Surgical, Family, and Social History     She has a past medical history of Asthma, Crohn disease ([address] Hypoglycemia, N&V (nausea and vomiting), Nausea & vomiting, Seizures ([address] and Staphylococcus infection in conditions classified elsewhere and of unspecified site. She has a past surgical history that includes Appendectomy; Cholecystectomy;  section; Breast reduction surgery; Tubal ligation; Endometrial ablation; Colonoscopy (09/13/10); Colonoscopy (2011);  section (2005); Upper gastrointestinal endoscopy (4/15/13); Colonoscopy (4/15/13); Upper gastrointestinal endoscopy (7/3/2014); Colonoscopy (3/6/2015);  Upper gastrointestinal endoscopy (3/6/2015); Upper gastrointestinal endoscopy (3/10/15); and total colectomy. Her family history is not on file. She reports that she quit smoking about 15 months ago. Her smoking use included cigarettes. She started smoking about 17 years ago. She has a 1.50 pack-year smoking history. She has never used smokeless tobacco. She reports that she does not drink alcohol or use drugs. Medications     Previous Medications    ALBUTEROL SULFATE HFA (VENTOLIN HFA) 108 (90 BASE) MCG/ACT INHALER    Inhale 2 puffs into the lungs every 6 hours as needed for Wheezing    ALBUTEROL SULFATE  (90 BASE) MCG/ACT INHALER    TAKE 2 PUFFS BY INHALATION EVERY 4 HOURS AS NEEDED    ALPRAZOLAM (XANAX) 1 MG TABLET    Take 1 tablet by mouth nightly as needed for Sleep for up to 28 days. CITALOPRAM (CELEXA) 20 MG TABLET    TAKE HALF A TABLET BY MOUTH EVERY DAY FOR 14 DAYS. THEN INCREASE TO 1 TABLET EVERY DAY. DICYCLOMINE (BENTYL) 10 MG CAPSULE    Take 1 capsule by mouth as needed (prn)    FLUCONAZOLE (DIFLUCAN) 150 MG TABLET    Take 1 tablet by mouth daily    FUROSEMIDE (LASIX) 20 MG TABLET    Take 1 tablet by mouth daily as needed (for swelling)    IBUPROFEN (ADVIL;MOTRIN) 600 MG TABLET    Take 1 tablet by mouth every 6 hours as needed for Pain    LOPERAMIDE (IMODIUM) 2 MG CAPSULE    Take 1 capsule by mouth 2 times daily    METHOCARBAMOL (ROBAXIN) 500 MG TABLET    Take 500 mg by mouth    PROMETHAZINE (PHENERGAN) 25 MG TABLET    Take 1 tablet by mouth every 8 hours as needed for Nausea    ZOLPIDEM (AMBIEN) 10 MG TABLET    Take 10 mg by mouth nightly as needed for Sleep. Allergies     She is allergic to latex; remicade [infliximab injection]; sulfa antibiotics; flagyl [metronidazole]; and morphine. Physical Exam     INITIAL VITALS: BP: (!) 112/100, Temp: 98.6 °F (37 °C), Pulse: 98, Resp: 20, SpO2: 99 %    Physical Exam  Constitutional:       General: She is not in acute distress. Appearance: She is well-developed. She is ill-appearing. She is not diaphoretic. HENT:      Head: Normocephalic and atraumatic. Cardiovascular:      Rate and Rhythm: Normal rate and regular rhythm. Heart sounds: Normal heart sounds. No murmur. Pulmonary:      Effort: Pulmonary effort is normal. No respiratory distress. Breath sounds: Normal breath sounds. No decreased breath sounds or wheezing. Abdominal:      General: Bowel sounds are normal. There is no distension. Palpations: Abdomen is soft. Tenderness: There is no abdominal tenderness. Skin:     General: Skin is warm and dry. Capillary Refill: Capillary refill takes less than 2 seconds. Findings: No erythema. Neurological:      Mental Status: She is alert and oriented to person, place, and time. Psychiatric:         Behavior: Behavior normal.         Diagnostic Results     EKG   Interpreted inconjunction with emergency department physician No att. providers found  Rhythm: normal sinus   Rate: normal  Axis: normal  Ectopy: none  Conduction: normal  ST Segments: no acute change  T Waves: no acute change  Q Waves: none  Clinical Impression: no acute changes  Comparison:  Similar to prior    RADIOLOGY:  XR CHEST 1 VIEW   Final Result   1. No evidence of acute cardiopulmonary disease.           LABS:   Results for orders placed or performed during the hospital encounter of 01/07/21   CBC Auto Differential   Result Value Ref Range    WBC 5.6 4.0 - 11.0 K/uL    RBC 4.46 4.00 - 5.20 M/uL    Hemoglobin 12.9 12.0 - 16.0 g/dL    Hematocrit 39.1 36.0 - 48.0 %    MCV 87.6 80.0 - 100.0 fL    MCH 28.9 26.0 - 34.0 pg    MCHC 33.0 31.0 - 36.0 g/dL    RDW 14.1 12.4 - 15.4 %    Platelets 479 075 - 965 K/uL    MPV 8.8 5.0 - 10.5 fL    Neutrophils % 53.3 %    Lymphocytes % 38.2 %    Monocytes % 6.9 %    Eosinophils % 1.1 %    Basophils % 0.5 %    Neutrophils Absolute 3.0 1.7 - 7.7 K/uL    Lymphocytes Absolute 2.1 1.0 - 5.1 K/uL    Monocytes Absolute 0.4 0.0 - 1.3 K/uL Eosinophils Absolute 0.1 0.0 - 0.6 K/uL    Basophils Absolute 0.0 0.0 - 0.2 K/uL   Basic Metabolic Panel w/ Reflex to MG   Result Value Ref Range    Sodium 137 136 - 145 mmol/L    Potassium reflex Magnesium 4.4 3.5 - 5.1 mmol/L    Chloride 107 99 - 110 mmol/L    CO2 21 21 - 32 mmol/L    Anion Gap 9 3 - 16    Glucose 75 70 - 99 mg/dL    BUN 7 7 - 20 mg/dL    CREATININE 0.6 0.6 - 1.1 mg/dL    GFR Non-African American >60 >60    GFR African American >60 >60    Calcium 9.2 8.3 - 10.6 mg/dL   Procalcitonin   Result Value Ref Range    Procalcitonin 0.04 0.00 - 0.15 ng/mL   EKG 12 Lead   Result Value Ref Range    Ventricular Rate 81 BPM    Atrial Rate 81 BPM    P-R Interval 136 ms    QRS Duration 74 ms    Q-T Interval 396 ms    QTc Calculation (Bazett) 460 ms    P Axis 62 degrees    R Axis 63 degrees    T Axis 37 degrees    Diagnosis       EKG performed in ER and to be interpreted by ER physician. Confirmed by MD, ER ([id number]),  Kishore Tejeda (RUDY)LOS ([id number]) on 1/7/2021 2:42:49 PM         RECENT VITALS:  BP: (!) 112/100, Temp: 98.6 °F (37 °C),Pulse: 98, Resp: 20, SpO2: 99 %     Procedures     n/a    ED Course     Nursing Notes, Past Medical Hx, Past Surgical Hx, Social Hx, Allergies, and FamilyHx were reviewed. The patient was giventhe following medications:  Orders Placed This Encounter   Medications    guaiFENesin-dextromethorphan (ROBITUSSIN DM) 100-10 MG/5ML syrup 5 mL    acetaminophen (TYLENOL) tablet 650 mg       CONSULTS:  None    MEDICAL DECISION MAKING / ASSESSMENT / Jovanny Lacy is a 44 y.o. female with SOB, cough, fatigue. Patient has exposure to covid. No concern for bacterial pna. Physical exam is generally benign, lungs clear, 100% O2 on RA, afebrile. Will get basic labs to check for any other problems, CXR, test for covid. CXR clear. Labs normal. Patient sat 99% during walk test. Patient medically stable for discharge. This patient was also evaluated by the attending physician.  All care plans 1.85

## 2021-03-26 NOTE — ED STATDOCS - NS ED MD DISPO DISCHARGE CCDA
Interested in learning more? [email or web address]   or call the provider teleconsultation line at [phone].  
Patient/Caregiver provided printed discharge information.

## 2021-07-23 PROBLEM — I10 ESSENTIAL (PRIMARY) HYPERTENSION: Chronic | Status: ACTIVE | Noted: 2020-12-02

## 2021-07-23 PROBLEM — E11.9 TYPE 2 DIABETES MELLITUS WITHOUT COMPLICATIONS: Chronic | Status: ACTIVE | Noted: 2020-12-02

## 2021-08-16 NOTE — ED PROVIDER NOTE - NS CPE EDP MUSC THORACIC LOC
no midline ttp. strength intact, ELISE, no palpable bony abnormalities. Acitretin Pregnancy And Lactation Text: This medication is Pregnancy Category X and should not be given to women who are pregnant or may become pregnant in the future. This medication is excreted in breast milk.

## 2021-08-20 ENCOUNTER — APPOINTMENT (OUTPATIENT)
Age: 51
End: 2021-08-20
Payer: MEDICAID

## 2021-08-20 PROCEDURE — 0012A: CPT

## 2022-04-18 NOTE — DISCHARGE NOTE PROVIDER - NSDCQMCOGNITION_NEU_ALL_CORE
No difficulties Information: Selecting Yes will display possible errors in your note based on the variables you have selected. This validation is only offered as a suggestion for you. PLEASE NOTE THAT THE VALIDATION TEXT WILL BE REMOVED WHEN YOU FINALIZE YOUR NOTE. IF YOU WANT TO FAX A PRELIMINARY NOTE YOU WILL NEED TO TOGGLE THIS TO 'NO' IF YOU DO NOT WANT IT IN YOUR FAXED NOTE.

## 2022-06-18 ENCOUNTER — EMERGENCY (EMERGENCY)
Facility: HOSPITAL | Age: 52
LOS: 0 days | Discharge: HOME | End: 2022-06-18
Attending: EMERGENCY MEDICINE
Payer: MEDICAID

## 2022-06-18 VITALS
SYSTOLIC BLOOD PRESSURE: 128 MMHG | HEART RATE: 57 BPM | DIASTOLIC BLOOD PRESSURE: 89 MMHG | RESPIRATION RATE: 16 BRPM | OXYGEN SATURATION: 100 % | TEMPERATURE: 99 F

## 2022-06-18 VITALS — WEIGHT: 164.91 LBS | HEIGHT: 62 IN

## 2022-06-18 DIAGNOSIS — Z79.84 LONG TERM (CURRENT) USE OF ORAL HYPOGLYCEMIC DRUGS: ICD-10-CM

## 2022-06-18 DIAGNOSIS — M25.511 PAIN IN RIGHT SHOULDER: ICD-10-CM

## 2022-06-18 DIAGNOSIS — Z87.440 PERSONAL HISTORY OF URINARY (TRACT) INFECTIONS: ICD-10-CM

## 2022-06-18 DIAGNOSIS — M75.31 CALCIFIC TENDINITIS OF RIGHT SHOULDER: ICD-10-CM

## 2022-06-18 DIAGNOSIS — Z90.710 ACQUIRED ABSENCE OF BOTH CERVIX AND UTERUS: Chronic | ICD-10-CM

## 2022-06-18 DIAGNOSIS — E11.9 TYPE 2 DIABETES MELLITUS WITHOUT COMPLICATIONS: ICD-10-CM

## 2022-06-18 DIAGNOSIS — I10 ESSENTIAL (PRIMARY) HYPERTENSION: ICD-10-CM

## 2022-06-18 DIAGNOSIS — Z90.710 ACQUIRED ABSENCE OF BOTH CERVIX AND UTERUS: ICD-10-CM

## 2022-06-18 DIAGNOSIS — Z79.82 LONG TERM (CURRENT) USE OF ASPIRIN: ICD-10-CM

## 2022-06-18 PROCEDURE — 99284 EMERGENCY DEPT VISIT MOD MDM: CPT

## 2022-06-18 PROCEDURE — 73030 X-RAY EXAM OF SHOULDER: CPT | Mod: RT

## 2022-06-18 PROCEDURE — 99283 EMERGENCY DEPT VISIT LOW MDM: CPT | Mod: 25

## 2022-06-18 PROCEDURE — 73030 X-RAY EXAM OF SHOULDER: CPT | Mod: 26,RT

## 2022-06-18 RX ORDER — IBUPROFEN 200 MG
1 TABLET ORAL
Qty: 12 | Refills: 0
Start: 2022-06-18 | End: 2022-06-20

## 2022-06-18 RX ORDER — OXYCODONE AND ACETAMINOPHEN 5; 325 MG/1; MG/1
1 TABLET ORAL ONCE
Refills: 0 | Status: DISCONTINUED | OUTPATIENT
Start: 2022-06-18 | End: 2022-06-18

## 2022-06-18 RX ADMIN — OXYCODONE AND ACETAMINOPHEN 1 TABLET(S): 5; 325 TABLET ORAL at 12:47

## 2022-06-18 NOTE — ED ADULT TRIAGE NOTE - CHIEF COMPLAINT QUOTE
Pt. to the ED C/O Right Shoulder Pain x 2 days with inability to move- Pt. denies Injuries, CP and SOB- Hx of DM

## 2022-06-18 NOTE — ED STATDOCS - NS_ ATTENDINGSCRIBEDETAILS _ED_A_ED_FT
I, Angel Garcia MD,  performed the initial face to face bedside interview with this patient regarding history of present illness, review of symptoms and relevant past medical, social and family history.  I completed an independent physical examination.  I was the initial provider who evaluated this patient.   I personally saw the patient and performed a substantive portion of the visit including all aspects of the medical decision making.  The history, relevant review of systems, past medical and surgical history, medical decision making, and physical examination was documented by the scribe in my presence and I attest to the accuracy of the documentation.

## 2022-06-18 NOTE — ED STATDOCS - NS ED ATTENDING STATEMENT MOD
This was a shared visit with the DAYA. I reviewed and verified the documentation and independently performed the documented:

## 2022-06-18 NOTE — ED STATDOCS - PHYSICAL EXAMINATION
Constitutional: NAD AAOx3  Eyes: PERRLA EOMI  Head: Normocephalic atraumatic  Mouth: MMM  Cardiac: regular rate   Resp: Lungs CTAB  GI: Abd s/nt/nd  Neuro: CN2-12 intact  MSK: reproducible TTP right shoulder. Normal peripheral pulse. No bony TTP. No swelling or warmth to joints. Compartments are soft.   Skin: No visible rashes Constitutional: NAD AAOx3  Eyes: PERRLA EOMI  Head: Normocephalic atraumatic  Mouth: MMM  Cardiac: regular rate   Resp: Lungs CTAB  GI: Abd s/nt/nd  Neuro: CN2-12 intact  MSK: reproducible TTP right shoulder. Normal peripheral pulse. No bony TTP. No swelling or warmth to joints. Compartments are soft.  no crepitus  Skin: No visible rashes

## 2022-06-18 NOTE — ED STATDOCS - ATTENDING APP SHARED VISIT CONTRIBUTION OF CARE
I, Angel Garcia MD, personally saw the patient with ACP.  I have personally performed a face to face diagnostic evaluation on this patient.  I have reviewed the ACP note and agree with the history, exam, and plan of care, except as noted.   The initial assessment was performed by myself and then the patient was handed off to the ACP. The patient was followed and re-evaluated by the ACP. All labs, imaging and procedures were evaluated and performed by the ACP and I was available for consultation if any questions in the patients care came up.

## 2022-06-18 NOTE — ED STATDOCS - NSFOLLOWUPINSTRUCTIONS_ED_ALL_ED_FT
Tendinitis calcificada    Calcific Tendinitis       La tendinitis calcificada ocurre cuando se depositan jung de calcio en un tendón. Los tendones son tejidos karen, similares a cordones, que conectan los músculos con los huesos. Los tendones son paula parte importante de las articulaciones. Permiten que la articulación se mueva y absorben gran parte de la tensión que paula articulación recibe america el uso.    Cuando el calcio se deposita en el tendón, yany se vuelve rígido, duele y se puede hinchar. La tendinitis calcificada ocurre con frecuencia en un tendón del manguito rotador. El manguito rotador es un vinod de músculos y tendones de la articulación del hombro.      ¿Cuáles son las causas?    La causa de la tendinitis calcificada no se conoce. Puede asociarse a:  •Uso excesivo del tendón, por movimientos repetitivos por ejemplo.      •Exceso de tensión en el tendón.      •Desgaste relacionado con la edad.      •Lesiones leves y repetidas.        ¿Qué incrementa el riesgo?    Es más probable que esta afección se manifieste en:  •Personas que hacen alguna actividad que implica movimientos repetitivos.      •Personas de edad avanzada.        ¿Cuáles son los signos o síntomas?    Los síntomas de esta afección incluyen:  •Dolor. Esta afección puede causar dolor o no. Cuando hay dolor, yany puede ocurrir al  la articulación.      •Sensibilidad al ejercer presión en el tendón.      •Isreal de estallido al  la articulación.      •Movimientos limitados de la articulación.      •Dificultad para dormir por dolor en la articulación.        ¿Cómo se diagnostica?    Esta afección se diagnostica mediante un examen físico. También pueden hacerle estudios, por ejemplo:  •Radiografías.      •Resonancia magnética (RM).      •Exploración por tomografía computarizada (TC).        ¿Cómo se trata?    Esta afección generalmente mejora noe. El tratamiento también puede incluir lo siguiente:  •Reposo, hielo, aplicación de presión (compresión), y levantamiento (elevación) de la yury por encima del nivel del corazón. [place] se conoce nabor terapia RHCE.      •Aplicación de calor en la yury afectada.      •Medicamentos que ayuden a reducir la inflamación o el dolor.      •Fisioterapia para fortalecer y estirar el tendón.      •Seguir un programa de ejercicios específicos para que la articulación funcione adecuadamente.      En casos graves, el tratamiento puede incluir:  •Inyección de corticoesteroides o analgésicos en la articulación o alrededor de la misma.      •Manipulación de la articulación después de administrarle medicamentos para adormecer el área (anestesia local).      •Inflar la articulación con líquido estéril para aumentar la flexibilidad de los tendones.      •Recibir terapia de ondas de choque, que consiste en enfocar ondas de isreal hacia la articulación.      Si otros tratamientos no funcionan, podría ser necesario realizar paula cirugía para limpiar los depósitos de calcio y reparar el tendón. La mayoría de las personas no necesita cirugía.      Siga estas instrucciones en jones casa:      Control del dolor, la rigidez y la hinchazón                 •Si se lo indican, aplique hielo sobre la yury afectada. Para hacer esto:  •Ponga el hielo en paula bolsa plástica.      •Coloque paula toalla entre la piel y la bolsa.      •Aplique el hielo america 20 minutos, 2 a 3 veces por día.      •Si se lo indican, aplique calor en la yury afectada antes de hacer ejercicios o tan frecuentemente nabor se lo haya indicado el médico. Use la madhav de calor que el médico le recomiende, nabor paula compresa de calor húmedo o paula almohadilla térmica.  •Coloque paula toalla entre la piel y la madhav de calor.      •Aplique calor america 20 a 30 minutos.      •Retire la madhav de calor si la piel se pone de color fuller brillante. [place] es especialmente importante si no puede sentir dolor, calor o frío. Puede correr un riesgo mayor de sufrir quemaduras.        •Mueva los dedos de la mano afectada o del pie afectado con frecuencia. [place] puede ayudar a reducir la rigidez y la hinchazón.      •Cuando esté sentado o acostado, levante (eleve) la yury lesionada por encima del nivel del corazón.      Medicamentos     •[place] los medicamentos de venta kristen y los recetados solamente nabor se lo haya indicado el médico.      •Pregúntele al médico si el medicamento recetado le impide conducir o usar maquinaria pesada.      Instrucciones generales     • No consuma ningún producto que contenga nicotina o tabaco, nabor cigarrillos, cigarrillos electrónicos y tabaco de mascar. Estos pueden retrasar la recuperación. Si necesita ayuda para dejar de consumir estos productos, consulte al médico.      •Siga las recomendaciones del médico acerca de la actividad y el ejercicio. Pregúntele al médico qué actividades son seguras para usted.      •Evite usar la yury afectada mientras experimenta síntomas de tendinitis.      •Use paula venda elástica o de compresión nabor se lo haya indicado el médico.      •Concurra a todas las visitas de seguimiento nabor se lo haya indicado el médico. [place] es importante.        Comuníquese con un médico si:    •Siente dolor o adormecimiento que empeora.      •Presenta paula nueva debilidad.      •Nota un aumento de la rigidez en la articulación o la sensación de que esta se afloja.      •Observa un aumento del enrojecimiento, hinchazón o dolor en la articulación afectada.      •Tiene fiebre y los síntomas empeoran repentinamente.        Resumen    •La tendinitis calcificada ocurre cuando se depositan jung de calcio en un tendón.      •La tendinitis calcificada ocurre con frecuencia en un tendón del manguito rotador, que es un vinod de músculos y tendones de la articulación del hombro.      •Esta afección puede causar dolor o no.      •Esta afección generalmente mejora noe.      •El tratamiento puede incluir reposo, hielo, medicamentos, fisioterapia y, en ocasiones infrecuentes, cirugía.      Esta información no tiene nabor fin reemplazar el consejo del médico. Asegúrese de hacerle al médico cualquier pregunta que tenga.

## 2022-06-18 NOTE — ED STATDOCS - PATIENT PORTAL LINK FT
You can access the FollowMyHealth Patient Portal offered by [facility] by registering at the following website: [email or web address]. By joining [employer]’s FollowMyHealth portal, you will also be able to view your health information using other applications (apps) compatible with our system.

## 2022-06-18 NOTE — ED STATDOCS - NS ED ROS FT
Constitutional: No fever or chills  Eyes: No visual changes  HEENT: No throat pain  CV: + chest pain  Resp: No SOB no cough  GI: No abd pain, nausea or vomiting  : No dysuria  MSK: +musculoskeletal pain, +right shoulder pain   Skin: No rash  Neuro: No headache Constitutional: No fever or chills  Eyes: No visual changes  HEENT: No throat pain  CV: no chest pain  Resp: No SOB no cough  GI: No abd pain, nausea or vomiting  : No dysuria  MSK: +musculoskeletal pain, +right shoulder pain   Skin: No rash  Neuro: No headache

## 2022-06-18 NOTE — ED PROCEDURE NOTE - GENERAL PROCEDURE DETAILS
Pt. placed in gown and on telemetry monitor per protocol. EKG completed and shown to MD at triage. Updated vs obtained. Bed locked with side rails up x2. Call light within reach. No other needs verbalized at this time. Will continue to monitor.
application of sling

## 2022-06-18 NOTE — ED STATDOCS - PROGRESS NOTE DETAILS
Calcifications noted on xray.   Will medicate with Percocet.  Charu Benson PA-C Pt. is a 52 year old female x HTN, DM, presenting to ED with right shoulder pain x 2 days.  Pt. states sharp pain with any movement, neg asthma.  Advil taken at home.  Pt. works as a  with uses arm consistently.  Charu Benson PA-C Calcifications noted on xray.   Will medicate with Percocet.  Will schedule patient for outpatient orthopedics.   #[id number] Charu Benson PA-C

## 2022-06-18 NOTE — ED STATDOCS - CLINICAL SUMMARY MEDICAL DECISION MAKING FREE TEXT BOX
51 y/o F presents to ED for right shoulder pain. Exam:  Plan: likely overuse syndrome/sprain vs rotator cuff injury vs calcific tendonitis no sign of infection, acspe. will obtain xray,  pain control, ortho follow up. 51 y/o F presents to ED for right shoulder pain. Exam:  Plan: likely overuse syndrome/sprain vs rotator cuff injury vs calcific tendonitis no sign of infection, acspe. will obtain xray,  pain control, ortho follow up.        Calcifications noted on xray.   Will medicate with Percocet.  Will schedule patient for outpatient orthopedics.   #[id number] Charu Benson PA-C 51 y/o F presents to ED for right shoulder pain. Exam:  Plan: likely overuse syndrome/sprain vs rotator cuff injury vs calcific tendonitis no sign of infection ACS, PE. will obtain xray,  pain control, ortho follow up.        Calcifications noted on xray.   Will medicate with Percocet.  Will schedule patient for outpatient orthopedics.   #[id number] Charu Benson PA-C

## 2022-06-24 ENCOUNTER — EMERGENCY (EMERGENCY)
Facility: HOSPITAL | Age: 52
LOS: 0 days | Discharge: HOME | End: 2022-06-24
Attending: EMERGENCY MEDICINE
Payer: MEDICAID

## 2022-06-24 VITALS
HEART RATE: 75 BPM | OXYGEN SATURATION: 100 % | SYSTOLIC BLOOD PRESSURE: 139 MMHG | TEMPERATURE: 99 F | RESPIRATION RATE: 18 BRPM | DIASTOLIC BLOOD PRESSURE: 89 MMHG

## 2022-06-24 VITALS
DIASTOLIC BLOOD PRESSURE: 79 MMHG | HEIGHT: 62 IN | HEART RATE: 92 BPM | RESPIRATION RATE: 18 BRPM | TEMPERATURE: 99 F | SYSTOLIC BLOOD PRESSURE: 124 MMHG | OXYGEN SATURATION: 100 %

## 2022-06-24 DIAGNOSIS — Z90.710 ACQUIRED ABSENCE OF BOTH CERVIX AND UTERUS: Chronic | ICD-10-CM

## 2022-06-24 DIAGNOSIS — M25.511 PAIN IN RIGHT SHOULDER: ICD-10-CM
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
## 2022-06-24 DIAGNOSIS — S43.401A UNSPECIFIED SPRAIN OF RIGHT SHOULDER JOINT, INITIAL ENCOUNTER: ICD-10-CM

## 2022-06-24 DIAGNOSIS — E11.9 TYPE 2 DIABETES MELLITUS WITHOUT COMPLICATIONS: ICD-10-CM

## 2022-06-24 DIAGNOSIS — Z79.84 LONG TERM (CURRENT) USE OF ORAL HYPOGLYCEMIC DRUGS: ICD-10-CM

## 2022-06-24 DIAGNOSIS — Z79.82 LONG TERM (CURRENT) USE OF ASPIRIN: ICD-10-CM

## 2022-06-24 DIAGNOSIS — R51.9 HEADACHE, UNSPECIFIED: ICD-10-CM

## 2022-06-24 DIAGNOSIS — I10 ESSENTIAL (PRIMARY) HYPERTENSION: ICD-10-CM
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
## 2022-06-24 PROCEDURE — 99284 EMERGENCY DEPT VISIT MOD MDM: CPT

## 2022-06-24 PROCEDURE — 99283 EMERGENCY DEPT VISIT LOW MDM: CPT | Mod: 25

## 2022-06-24 PROCEDURE — 93010 ELECTROCARDIOGRAM REPORT: CPT

## 2022-06-24 PROCEDURE — 93005 ELECTROCARDIOGRAM TRACING: CPT

## 2022-06-24 PROCEDURE — 96372 THER/PROPH/DIAG INJ SC/IM: CPT

## 2022-06-24 RX ORDER — OXYCODONE AND ACETAMINOPHEN 5; 325 MG/1; MG/1
1 TABLET ORAL ONCE
Refills: 0 | Status: DISCONTINUED | OUTPATIENT
Start: 2022-06-24 | End: 2022-06-24

## 2022-06-24 RX ORDER — CYCLOBENZAPRINE HYDROCHLORIDE 10 MG/1
1 TABLET, FILM COATED ORAL
Qty: 9 | Refills: 0
Start: 2022-06-24 | End: 2022-06-26

## 2022-06-24 RX ORDER — LIDOCAINE 4 G/100G
1 CREAM TOPICAL ONCE
Refills: 0 | Status: COMPLETED | OUTPATIENT
Start: 2022-06-24 | End: 2022-06-24

## 2022-06-24 RX ORDER — IBUPROFEN 200 MG
1 TABLET ORAL
Qty: 21 | Refills: 0
Start: 2022-06-24 | End: 2022-06-30

## 2022-06-24 RX ORDER — KETOROLAC TROMETHAMINE 30 MG/ML
60 SYRINGE (ML) INJECTION ONCE
Refills: 0 | Status: DISCONTINUED | OUTPATIENT
Start: 2022-06-24 | End: 2022-06-24

## 2022-06-24 RX ORDER — ACETAMINOPHEN 500 MG
2 TABLET ORAL
Qty: 40 | Refills: 0
Start: 2022-06-24 | End: 2022-06-28

## 2022-06-24 RX ADMIN — LIDOCAINE 1 PATCH: 4 CREAM TOPICAL at 20:29

## 2022-06-24 RX ADMIN — Medication 60 MILLIGRAM(S): at 20:29

## 2022-06-24 RX ADMIN — OXYCODONE AND ACETAMINOPHEN 1 TABLET(S): 5; 325 TABLET ORAL at 20:29

## 2022-06-24 NOTE — ED STATDOCS - NSFOLLOWUPINSTRUCTIONS_ED_ALL_ED_FT
Follow with your PMD within 48-72 hours.  Rest, no heavy lifting.  Warm compresses to area. Recommend Ortho consult to discuss possible MRI vs Physical Therapy- referral list provided.  Light walking. Take Motrin 600 mg every 8 hours for pain with food, Tylenol 500mg 1-2 tabs every 6 hours as needed ofr pain, cyclobenzaprine 5mg every 8 hours as needed for muscle spasm- caution drowsiness/do not drive. Any worsening pain, weakness, numbness, bowel or urinary incontinence return to ER

## 2022-06-24 NOTE — ED STATDOCS - PATIENT PORTAL LINK FT
You can access the FollowMyHealth Patient Portal offered by [facility] by registering at the following website: [email or web address]. By joining [employer]’s FollowMyHealth portal, you will also be able to view your health information using other applications (apps) compatible with our system.

## 2022-06-24 NOTE — ED PROVIDER NOTE - PHYSICAL EXAMINATION
neuro: CN II - XII intact, EOMI, PERRL, no papilledema, 5/5 muscle strength x 4 extremities, no sensory deficits, 2+ dtr globally, negative babinski, no ataxic gait, normal TIFFANIE and FNT, normal romberg

## 2022-06-24 NOTE — ED ADULT TRIAGE NOTE - CHIEF COMPLAINT QUOTE
Pt arrives to ED complaining of left arm pain and headache. Pt states she slept on her arm and now it hurts. Pt states she was seen here for headache but the medication she was given is not helping. Dat DM.

## 2022-06-24 NOTE — ED PROVIDER NOTE - OBJECTIVE STATEMENT
pt reports midl gernalized achy headache no blood thinners no vision changes and persistent wifght shoulder pain constant achy worse qwith movement some relief with ibuprofen. denies fever. denies neck pain. no chest pain or sob. no abd pain. no n/v/d. no urinary f/u/d. no back pain. no motor or sensory deficits. denies illicit drug use. no recent high risk  travel. no rash. no other acute issues symptoms or concerns

## 2022-06-24 NOTE — ED PROVIDER NOTE - CLINICAL SUMMARY MEDICAL DECISION MAKING FREE TEXT BOX
return to ed for intractable HA, persistent vomiting, or new onset motor/sensory deficits. do noty suspect infectious etiology clincally of either shoulder pain or headache

## 2022-06-24 NOTE — ED STATDOCS - PROGRESS NOTE DETAILS
PILY LOPEZ: Received signout and discussed plan with attending. Pt states she was seen in ED 6 days ago for shoulder pain. xray confirms calcific tendinosis, pt received pain meds and sling however states she has run out of pain meds and pain is not controlled with advil alone at home. Pt has ortho appt on 6/29. Denies trauma hx. no cervical midline ttp or deformity, +ttp to generalized rt shoulder and cervical paraspinal muscle ttp, limited rom of rt shoulder, able to range elbow wrist and hand to rue with reproducible pain to rt shoulder. Will send rx for pain to bridge to ortho appt. Advised to discontinue sling use as it may be contributing to frozen shoulder syndrome. Pt amenable with plan. Stable for d/c home with followup.

## 2022-07-06 ENCOUNTER — APPOINTMENT (OUTPATIENT)
Dept: ORTHOPEDIC SURGERY | Facility: CLINIC | Age: 52
End: 2022-07-06

## 2022-08-01 NOTE — ED ADULT NURSE NOTE - NS ED NURSE IV DC DT
1. Refill request received from: [facility]  2. Medication Requested: Metformin  3. Directions:tk 2 tabs bid for total dose of 2000 mg  4. Quantity:120  5. Last Office Visit: 01/07/22                    Has it been over a year since the last appointment (6 months for diabetes)? no                    If No:     Move on to next question.                    If Yes:                      Change refill quantity to 1 month.                      Route to Provider or Pool & let them know its been over a year since patient has been seen.                      If they do not have an upcoming appointment- reach out to family to schedule or route to .  6. Next Appointment Scheduled for: none scheduled  7. Last refill: 06/19/22  8. Sent To: ANIL   10-Jul-2017 02:25

## 2023-03-14 NOTE — ED ADULT TRIAGE NOTE - BMI (KG/M2)
Health Maintenance Due   Topic Date Due   • COVID-19 Vaccine (1) Never done   • Pneumococcal Vaccine 0-64 (1 - PCV) Never done   • Hepatitis C Screening  Never done   • Shingles Vaccine (1 of 2) Never done   • DTaP/Tdap/Td Vaccine (1 - Tdap) 08/11/2004   • Colorectal Cancer Risk - Colonoscopy  03/29/2022   • Influenza Vaccine (1) 09/01/2022   • Depression Screening  01/21/2023   • Traditional Medicare- Medicare Wellness Visit  01/28/2023       Patient is due for topics as listed above but is not proceeding with at this time. Pt is aware and declines.    Patient Care Team:  Dominick Cortez MD as PCP - General (Internal Medicine)  Malka Perry RN as Registered Nurse (RN Cancer Nurse Navigator)  TIESHA Art as  (Social Work)  Kiran Gan MD as Gastroenterologist (Gastroenterology)       30.8

## 2023-07-14 ENCOUNTER — EMERGENCY (EMERGENCY)
Facility: HOSPITAL | Age: 53
LOS: 0 days | Discharge: HOME | End: 2023-07-14
Attending: EMERGENCY MEDICINE
Payer: MEDICAID

## 2023-07-14 VITALS
OXYGEN SATURATION: 100 % | HEART RATE: 72 BPM | DIASTOLIC BLOOD PRESSURE: 88 MMHG | RESPIRATION RATE: 26 BRPM | SYSTOLIC BLOOD PRESSURE: 120 MMHG | TEMPERATURE: 98 F

## 2023-07-14 VITALS — HEIGHT: 64 IN | WEIGHT: 173.94 LBS

## 2023-07-14 DIAGNOSIS — D72.829 ELEVATED WHITE BLOOD CELL COUNT, UNSPECIFIED: ICD-10-CM

## 2023-07-14 DIAGNOSIS — R10.9 UNSPECIFIED ABDOMINAL PAIN: ICD-10-CM

## 2023-07-14 DIAGNOSIS — E11.9 TYPE 2 DIABETES MELLITUS WITHOUT COMPLICATIONS: ICD-10-CM

## 2023-07-14 DIAGNOSIS — Z90.710 ACQUIRED ABSENCE OF BOTH CERVIX AND UTERUS: Chronic | ICD-10-CM

## 2023-07-14 DIAGNOSIS — Z79.84 LONG TERM (CURRENT) USE OF ORAL HYPOGLYCEMIC DRUGS: ICD-10-CM

## 2023-07-14 DIAGNOSIS — I10 ESSENTIAL (PRIMARY) HYPERTENSION: ICD-10-CM

## 2023-07-14 DIAGNOSIS — R53.1 WEAKNESS: ICD-10-CM

## 2023-07-14 DIAGNOSIS — K65.4 SCLEROSING MESENTERITIS: ICD-10-CM

## 2023-07-14 DIAGNOSIS — Z79.82 LONG TERM (CURRENT) USE OF ASPIRIN: ICD-10-CM

## 2023-07-14 DIAGNOSIS — Z90.710 ACQUIRED ABSENCE OF BOTH CERVIX AND UTERUS: ICD-10-CM

## 2023-07-14 LAB
ALBUMIN SERPL ELPH-MCNC: 3.6 G/DL — SIGNIFICANT CHANGE UP (ref 3.3–5)
ALP SERPL-CCNC: 79 U/L — SIGNIFICANT CHANGE UP (ref 40–120)
ALT FLD-CCNC: 19 U/L — SIGNIFICANT CHANGE UP (ref 12–78)
ANION GAP SERPL CALC-SCNC: 4 MMOL/L — LOW (ref 5–17)
APPEARANCE UR: ABNORMAL
AST SERPL-CCNC: 14 U/L — LOW (ref 15–37)
BACTERIA # UR AUTO: NEGATIVE — SIGNIFICANT CHANGE UP
BASOPHILS # BLD AUTO: 0.06 K/UL — SIGNIFICANT CHANGE UP (ref 0–0.2)
BASOPHILS NFR BLD AUTO: 0.5 % — SIGNIFICANT CHANGE UP (ref 0–2)
BILIRUB SERPL-MCNC: 0.3 MG/DL — SIGNIFICANT CHANGE UP (ref 0.2–1.2)
BILIRUB UR-MCNC: NEGATIVE — SIGNIFICANT CHANGE UP
BUN SERPL-MCNC: 12 MG/DL — SIGNIFICANT CHANGE UP (ref 7–23)
CALCIUM SERPL-MCNC: 8.8 MG/DL — SIGNIFICANT CHANGE UP (ref 8.5–10.1)
CHLORIDE SERPL-SCNC: 106 MMOL/L — SIGNIFICANT CHANGE UP (ref 96–108)
CO2 SERPL-SCNC: 30 MMOL/L — SIGNIFICANT CHANGE UP (ref 22–31)
COLOR SPEC: YELLOW — SIGNIFICANT CHANGE UP
COMMENT - URINE: SIGNIFICANT CHANGE UP
CREAT SERPL-MCNC: 0.7 MG/DL — SIGNIFICANT CHANGE UP (ref 0.5–1.3)
DIFF PNL FLD: NEGATIVE — SIGNIFICANT CHANGE UP
EGFR: 104 ML/MIN/1.73M2 — SIGNIFICANT CHANGE UP
EOSINOPHIL # BLD AUTO: 0.31 K/UL — SIGNIFICANT CHANGE UP (ref 0–0.5)
EOSINOPHIL NFR BLD AUTO: 2.5 % — SIGNIFICANT CHANGE UP (ref 0–6)
EPI CELLS # UR: ABNORMAL
GLUCOSE SERPL-MCNC: 106 MG/DL — HIGH (ref 70–99)
GLUCOSE UR QL: NEGATIVE — SIGNIFICANT CHANGE UP
HCT VFR BLD CALC: 40.5 % — SIGNIFICANT CHANGE UP (ref 34.5–45)
HGB BLD-MCNC: 13.3 G/DL — SIGNIFICANT CHANGE UP (ref 11.5–15.5)
IMM GRANULOCYTES NFR BLD AUTO: 0.7 % — SIGNIFICANT CHANGE UP (ref 0–0.9)
KETONES UR-MCNC: NEGATIVE — SIGNIFICANT CHANGE UP
LEUKOCYTE ESTERASE UR-ACNC: ABNORMAL
LYMPHOCYTES # BLD AUTO: 27.1 % — SIGNIFICANT CHANGE UP (ref 13–44)
LYMPHOCYTES # BLD AUTO: 3.38 K/UL — HIGH (ref 1–3.3)
MCHC RBC-ENTMCNC: 29.4 PG — SIGNIFICANT CHANGE UP (ref 27–34)
MCHC RBC-ENTMCNC: 32.8 GM/DL — SIGNIFICANT CHANGE UP (ref 32–36)
MCV RBC AUTO: 89.6 FL — SIGNIFICANT CHANGE UP (ref 80–100)
MONOCYTES # BLD AUTO: 0.78 K/UL — SIGNIFICANT CHANGE UP (ref 0–0.9)
MONOCYTES NFR BLD AUTO: 6.3 % — SIGNIFICANT CHANGE UP (ref 2–14)
NEUTROPHILS # BLD AUTO: 7.86 K/UL — HIGH (ref 1.8–7.4)
NEUTROPHILS NFR BLD AUTO: 62.9 % — SIGNIFICANT CHANGE UP (ref 43–77)
NITRITE UR-MCNC: NEGATIVE — SIGNIFICANT CHANGE UP
PH UR: 5 — SIGNIFICANT CHANGE UP (ref 5–8)
PLATELET # BLD AUTO: 301 K/UL — SIGNIFICANT CHANGE UP (ref 150–400)
POTASSIUM SERPL-MCNC: 4.2 MMOL/L — SIGNIFICANT CHANGE UP (ref 3.5–5.3)
POTASSIUM SERPL-SCNC: 4.2 MMOL/L — SIGNIFICANT CHANGE UP (ref 3.5–5.3)
PROT SERPL-MCNC: 8.2 GM/DL — SIGNIFICANT CHANGE UP (ref 6–8.3)
PROT UR-MCNC: NEGATIVE — SIGNIFICANT CHANGE UP
RBC # BLD: 4.52 M/UL — SIGNIFICANT CHANGE UP (ref 3.8–5.2)
RBC # FLD: 12.5 % — SIGNIFICANT CHANGE UP (ref 10.3–14.5)
RBC CASTS # UR COMP ASSIST: NEGATIVE /HPF — SIGNIFICANT CHANGE UP (ref 0–4)
SODIUM SERPL-SCNC: 140 MMOL/L — SIGNIFICANT CHANGE UP (ref 135–145)
SP GR SPEC: 1.01 — SIGNIFICANT CHANGE UP (ref 1.01–1.02)
UROBILINOGEN FLD QL: NEGATIVE — SIGNIFICANT CHANGE UP
WBC # BLD: 12.48 K/UL — HIGH (ref 3.8–10.5)
WBC # FLD AUTO: 12.48 K/UL — HIGH (ref 3.8–10.5)
WBC UR QL: SIGNIFICANT CHANGE UP /HPF (ref 0–5)

## 2023-07-14 PROCEDURE — 36415 COLL VENOUS BLD VENIPUNCTURE: CPT

## 2023-07-14 PROCEDURE — 96374 THER/PROPH/DIAG INJ IV PUSH: CPT

## 2023-07-14 PROCEDURE — 96375 TX/PRO/DX INJ NEW DRUG ADDON: CPT

## 2023-07-14 PROCEDURE — 87086 URINE CULTURE/COLONY COUNT: CPT

## 2023-07-14 PROCEDURE — 85025 COMPLETE CBC W/AUTO DIFF WBC: CPT

## 2023-07-14 PROCEDURE — 80053 COMPREHEN METABOLIC PANEL: CPT

## 2023-07-14 PROCEDURE — 74176 CT ABD & PELVIS W/O CONTRAST: CPT | Mod: MA

## 2023-07-14 PROCEDURE — 74176 CT ABD & PELVIS W/O CONTRAST: CPT | Mod: 26,MA

## 2023-07-14 PROCEDURE — 99285 EMERGENCY DEPT VISIT HI MDM: CPT

## 2023-07-14 PROCEDURE — 99284 EMERGENCY DEPT VISIT MOD MDM: CPT | Mod: 25

## 2023-07-14 PROCEDURE — 81001 URINALYSIS AUTO W/SCOPE: CPT

## 2023-07-14 RX ORDER — MORPHINE SULFATE 50 MG/1
4 CAPSULE, EXTENDED RELEASE ORAL ONCE
Refills: 0 | Status: DISCONTINUED | OUTPATIENT
Start: 2023-07-14 | End: 2023-07-14

## 2023-07-14 RX ORDER — SODIUM CHLORIDE 9 MG/ML
1000 INJECTION INTRAMUSCULAR; INTRAVENOUS; SUBCUTANEOUS ONCE
Refills: 0 | Status: COMPLETED | OUTPATIENT
Start: 2023-07-14 | End: 2023-07-14

## 2023-07-14 RX ORDER — OXYCODONE AND ACETAMINOPHEN 5; 325 MG/1; MG/1
1 TABLET ORAL
Qty: 16 | Refills: 0
Start: 2023-07-14

## 2023-07-14 RX ORDER — ONDANSETRON 8 MG/1
4 TABLET, FILM COATED ORAL ONCE
Refills: 0 | Status: COMPLETED | OUTPATIENT
Start: 2023-07-14 | End: 2023-07-14

## 2023-07-14 RX ADMIN — ONDANSETRON 4 MILLIGRAM(S): 8 TABLET, FILM COATED ORAL at 17:42

## 2023-07-14 RX ADMIN — SODIUM CHLORIDE 1000 MILLILITER(S): 9 INJECTION INTRAMUSCULAR; INTRAVENOUS; SUBCUTANEOUS at 17:41

## 2023-07-14 RX ADMIN — MORPHINE SULFATE 4 MILLIGRAM(S): 50 CAPSULE, EXTENDED RELEASE ORAL at 17:42

## 2023-07-14 NOTE — ED STATDOCS - CARE PROVIDER_API CALL
Dave Restrepo  Gastroenterology  [address] [place] [address]  Phone: [phone]  Follow Up Time:     Angel Clancy  Gastroenterology  [address] [place] [id number]  Phone: [phone]  Fax: [phone]  Follow Up Time:

## 2023-07-14 NOTE — ED ADULT NURSE NOTE - CAS EDN DISCHARGE ASSESSMENT
FAMILY PRACTICE OFFICE VISIT       NAME: Mihir Finn  AGE: 73 y o  SEX: male       : 1956        MRN: [id number]        Assessment and Plan     1  Diabetes mellitus type 2 in obese [facility])  Assessment & Plan:    Lab Results   Component Value Date    HGBA1C 5 9 2022   Decrease dose of metformin from 2000 to 1500 mg daily  Continue glipizide ER 10 mg daily, may consider reducing dose to 5 mg daily  Patient offers no complaints of hypoglycemia   Patient should start Accu-Cheks at home, prescription sent  Request clinical pharmacist follow-up    Orders:  -     POCT hemoglobin A1c  -     Blood Glucose Monitoring Suppl (OneTouch Verio Reflect) w/Device KIT; Check blood sugars once daily  Please substitute with appropriate alternative as covered by patient's insurance  Dx: E11 65  -     glucose blood (OneTouch Verio) test strip; Check blood sugars once daily  Please substitute with appropriate alternative as covered by patient's insurance  Dx: E11 65  -     OneTouch Delica Lancets 58O MISC; Check blood sugars once daily  Please substitute with appropriate alternative as covered by patient's insurance  Dx: E11 65  -     Comprehensive metabolic panel; Future  -     TSH, 3rd generation; Future    2  Essential hypertension  Assessment & Plan:  Blood pressure is well controlled  Continue Lisinopril 20 mg once a day, metoprolol 100 mg twice a day      3  Postoperative anemia  -     CBC and differential; Future    4  Class 2 severe obesity due to excess calories with serious comorbidity and body mass index (BMI) of 37 0 to 37 9 in adult ([facility] [address] )    5  Stenosis of lumbosacral spine  Assessment & Plan:  ME ARTHDSIS POST/POSTEROLATRL/POSTINTERBODY LUMBAR    L1-5 POSTERIOR LUMBAR INTERBODY FUSION,PEDICLE SCREWS,RODS,CAGE,BONE MORPHOGENIC PROTEIN,ALLOGRAFT      Dr Jennings 22, [facility]    Ongoing low back soreness, right lower extremity weakness  Patient reports interim resolution of right lower extremity sciatica  He wears bone growth stimulator and remains on physical therapy  Patient uses oxycodone very infrequently as needed  He remains under ongoing care of [facility]      6  Paroxysmal atrial fibrillation (HCC)  Assessment & Plan:  ASA and Metoprolol  Patient remains in normal sinus rhythm      7  Dyslipidemia  Assessment & Plan:  Continue atorvastatin 40 mg daily, monitor labs    Most recent LDL is 46, late March 2022          Patient presents for follow-up after recent L5-S1 fusion at [facility] with subsequent rehabilitation  He is recovering well after surgery  Assessment and plan as outlined above  We discussed Prevnar 20 vaccination, patient prefers to defer to our next office visit and will strongly consider  Patient is due for colorectal screening  He should proceed with colonoscopy due to family history of colon cancer  He is not ready to schedule this procedure due to recent low back surgery and ongoing pain  We are planning to discuss it at our next office visit in 3 months  There are no Patient Instructions on file for this visit  Return in about 3 months (around 10/5/2022) for follow up  Discussed with the patient and all questioned fully answered  He will call me if any problems arise  M*Modal software was used to dictate this note  It may contain errors with dictating incorrect words/spelling  Please contact provider directly with any questions  Chief Complaint     Chief Complaint   Patient presents with    Diabetic Eye Exam     Paper given    Care Gap Cologuard     Due    Follow-up     Post Surgery- L1-5 fused     Hbg A1C     Completed       History of Present Illness     Patient presents for follow-up after recent low back surgery  He remains under ongoing care of [facility] in physical therapy  Patient states that surgery went well    Back pain has significantly improved but patient is still experiencing significant low back soreness  Symptoms of painful right lower extremity sciatica have resolved  Patient is walking well on a flat surface, limited stairs for now  Right leg still feels weak  Appetite is decreased, but patient is all his meals  He denies symptoms of abdominal pain, nausea, vomiting, dyspepsia or diarrhea  No complaints of chest pain or palpitations  He admits to mild shortness of breath, chronic  Most recent hemoglobin performed at [facility] and May of 2022 was 10 6  Patient is wearing bone growth stimulator for a few hours per day  He remains on daily medications for hypertension, hyperlipidemia and type 2 diabetes  He was evaluated by [facility] prior to low back surgery in spring of 2022  Hemoglobin A1c was performed today via fingerstick and is excellent at 5 9%  Review of Systems   Review of Systems   Constitutional: Positive for appetite change (as per HPI) and unexpected weight change (since onset of back pain and surgery)  Negative for fever  HENT: Negative  Eyes: Negative  Respiratory: Positive for shortness of breath (chronic,mild, on exertion)  Cardiovascular: Negative  Gastrointestinal: Negative  Endocrine: Negative  Genitourinary: Negative  Musculoskeletal: Positive for arthralgias and back pain  Skin: Negative  Allergic/Immunologic: Negative  Neurological: Negative  Hematological: Negative  Psychiatric/Behavioral: Negative          Active Problem List     Patient Active Problem List   Diagnosis    Essential hypertension    Diabetes mellitus type 2 in obese ([address] )    Vitamin D deficiency    Class 2 severe obesity due to excess calories with serious comorbidity and body mass index (BMI) of 37 0 to 37 9 in adult [facility])    Osteoarthritis of left knee    Bladder wall thickening    Status post left knee replacement    Non-MI Troponin Elevation    Paroxysmal atrial fibrillation ([address] )    Dyslipidemia  Skin mole    Left arm weakness    Edema of hand    GONSALEZ (dyspnea on exertion)    Stenosis of lumbosacral spine       Past Medical History:  Past Medical History:   Diagnosis Date    [facility] 8/17/2020    LAURENCE (acute kidney injury) ([facility] ) 8/16/2020    Arthritis     knees    Community acquired pneumonia of left lower lobe of lung 8/12/2020    Diabetes mellitus [facility]     Hypertension     Measles, [place] (rubella)     Mumps     Snoring     Loudly       Past Surgical History:  Past Surgical History:   Procedure Laterality Date    CYSTOSCOPY      KNEE ARTHROSCOPY Left     knee    ORIF PELVIC FRACTURE      TOTAL KNEE ARTHROPLASTY Left 6/10/2019    Procedure: ARTHROPLASTY KNEE TOTAL;  Surgeon: Burke Bell MD;  Location: [facility];  Service: Orthopedics       Family History:  Family History   Problem Relation Age of Onset    Ovarian cancer Mother     Heart disease Mother     Diabetes type II Mother     Colon cancer Father     Heart disease Father     Nephrolithiasis Father        Social History:  Social History     Socioeconomic History    Marital status: Single     Spouse name: Not on file    Number of children: Not on file    Years of education: Not on file    Highest education level: Not on file   Occupational History    Not on file   Tobacco Use    Smoking status: Never Smoker    Smokeless tobacco: Never Used   Vaping Use    Vaping Use: Never used   Substance and Sexual Activity    Alcohol use: Yes     Comment: Occasionally, not in excess - As per [employer]     Drug use: Never    Sexual activity: Not on file   Other Topics Concern    Not on file   Social History Narrative    Not on file     Social Determinants of Health     Financial Resource Strain: Not on file   Food Insecurity: Not on file   Transportation Needs: Not on file   Physical Activity: Not on file   Stress: Not on file   Social Connections: Not on file   Intimate Partner Violence: Not on file   Housing Stability: Not on file         Objective     Vitals:    07/05/22 1632   BP: 130/80   BP Location: Left arm   Patient Position: Sitting   Cuff Size: Large   Pulse: 65   Resp: 18   Temp: 98 °F (36 7 °C)   TempSrc: Temporal   SpO2: 99%   Weight: 106 kg (233 lb)   Height: 5' 6" (1 676 m)       Wt Readings from Last 3 Encounters:   07/05/22 106 kg (233 lb)   04/28/22 114 kg (251 lb)   11/17/21 129 kg (285 lb)       Physical Exam  Vitals and nursing note reviewed  Constitutional:       General: He is not in acute distress  Appearance: Normal appearance  He is well-developed  He is not ill-appearing  HENT:      Head: Normocephalic and atraumatic  Eyes:      General: No scleral icterus  Conjunctiva/sclera: Conjunctivae normal    Neck:      Vascular: No carotid bruit  Cardiovascular:      Rate and Rhythm: Normal rate and regular rhythm  Heart sounds: Normal heart sounds  No murmur heard  Pulmonary:      Effort: Pulmonary effort is normal  No respiratory distress  Breath sounds: Normal breath sounds  No wheezing or rales  Abdominal:      General: Bowel sounds are normal  There is no distension or abdominal bruit  Musculoskeletal:      Cervical back: Neck supple  No rigidity  Right lower leg: No edema  Left lower leg: No edema  Comments: Ambulates independently, slowly, without assistive device  Worsening of back pain upon getting up from the chair  Skin:     General: Skin is warm  Neurological:      General: No focal deficit present  Mental Status: He is alert and oriented to person, place, and time  Cranial Nerves: No cranial nerve deficit  Psychiatric:         Mood and Affect: Mood normal          Behavior: Behavior normal          Thought Content:  Thought content normal           Pertinent Laboratory/Diagnostic Studies:    Lab Results   Component Value Date    WBC 7 68 03/26/2022    HGB 12 8 03/26/2022    HCT 37 0 03/26/2022    MCV 85 03/26/2022     03/26/2022       No results found for: TSH    Lab Results   Component Value Date    CHOL 218 (H) 03/17/2017     Lab Results   Component Value Date    TRIG 123 03/26/2022     Lab Results   Component Value Date    HDL 33 (L) 03/26/2022     Lab Results   Component Value Date    LDLCALC 56 03/26/2022     Lab Results   Component Value Date    HGBA1C 5 9 07/05/2022     Lab Results   Component Value Date    SODIUM 134 (L) 03/26/2022    K 4 7 03/26/2022     03/26/2022    CO2 29 03/26/2022    AGAP 3 (L) 03/26/2022    BUN 17 03/26/2022    CREATININE 1 28 03/26/2022    GLUC 158 (H) 11/21/2020    GLUF 159 (H) 03/26/2022    CALCIUM 9 9 03/26/2022    AST 13 03/26/2022    ALT 12 03/26/2022    ALKPHOS 90 03/26/2022    PROT 7 1 03/17/2017    TP 7 9 03/26/2022    BILITOT 0 8 03/17/2017    TBILI 0 58 03/26/2022    EGFR 58 03/26/2022       Orders Placed This Encounter   Procedures    CBC and differential    Comprehensive metabolic panel    TSH, 3rd generation    POCT hemoglobin A1c       ALLERGIES:  No Known Allergies    Current Medications     Current Outpatient Medications   Medication Sig Dispense Refill    ACCU-CHEK LARRY PLUS test strip       ACCU-CHEK FASTCLIX LANCETS MISC       aspirin (ECOTRIN LOW STRENGTH) 81 mg EC tablet Take 81 mg by mouth daily      atorvastatin (LIPITOR) 40 mg tablet Take 1 tablet by mouth once daily 30 tablet 11    Blood Glucose Monitoring Suppl (OneTouch Verio Reflect) w/Device KIT Check blood sugars once daily  Please substitute with appropriate alternative as covered by patient's insurance  Dx: E11 65 1 kit 0    Ergocalciferol (VITAMIN D2 PO) Take 2,000 Units by mouth in the morning        Fluticasone-Salmeterol,sensor, (AirDuo Digihaler) 113-14 MCG/ACT AEPB Inhale 1 puff 2 (two) times a day Rinse mouth after use   1 each 6    glipiZIDE (GLUCOTROL XL) 10 mg 24 hr tablet Take 1 tablet by mouth once daily 30 tablet 0    glucose blood (OneTouch Verio) test strip Check blood sugars once daily  Please substitute with appropriate alternative as covered by patient's insurance  Dx: E11 65 100 each 3    lisinopril (ZESTRIL) 20 mg tablet Take 1 tablet (20 mg total) by mouth daily 90 tablet 3    metFORMIN (GLUCOPHAGE-XR) 500 mg 24 hr tablet TAKE 4 TABLETS BY MOUTH ONCE DAILY 120 tablet 0    metoprolol tartrate (LOPRESSOR) 100 mg tablet Take 1 tablet (100 mg total) by mouth every 12 (twelve) hours 180 tablet 3    OneTouch Delica Lancets 33F MISC Check blood sugars once daily  Please substitute with appropriate alternative as covered by patient's insurance  Dx: E11 65 100 each 3    oxyCODONE (ROXICODONE) 5 immediate release tablet Take 5 mg by mouth 2 (two) times a day as needed       No current facility-administered medications for this visit         Medications Discontinued During This Encounter   Medication Reason    diclofenac (VOLTAREN) 75 mg EC tablet Therapy completed    gabapentin (NEURONTIN) 300 mg capsule Therapy completed    lidocaine (LIDODERM) 5 % Therapy completed    melatonin 3 mg Therapy completed    methocarbamol (ROBAXIN) 500 mg tablet Therapy completed    oxyCODONE-acetaminophen (PERCOCET) 5-325 mg per tablet Therapy completed       Health Maintenance     Health Maintenance   Topic Date Due    Medicare Annual Wellness Visit (AWV)  Never done    COVID-19 Vaccine (1) Never done    Pneumococcal Vaccine: 65+ Years (1 - PCV) Never done    DM Eye Exam  Never done    HIV Screening  Never done    Colorectal Cancer Screening  Never done    DTaP,Tdap,and Td Vaccines (2 - Td or Tdap) 01/01/2011    BMI: Followup Plan  06/17/2022    Influenza Vaccine (1) 09/01/2022    HEMOGLOBIN A1C  01/05/2023    Depression Screening  03/26/2023    Diabetic Foot Exam  03/26/2023    Fall Risk  07/05/2023    BMI: Adult  07/05/2023    Hepatitis C Screening  Completed    HIB Vaccine  Aged Out    Hepatitis B Vaccine  Aged Out    IPV Vaccine  Aged Out  Hepatitis A Vaccine  Aged Out    Meningococcal ACWY Vaccine  Aged Out    HPV Vaccine  Aged Out       Immunization History   Administered Date(s) Administered    Influenza Quadrivalent Preservative Free 3 years and older IM 08/03/2016    Tdap 01/01/2001       Shakila Almeida MD Alert and oriented to person, place and time

## 2023-07-14 NOTE — ED STATDOCS - PROGRESS NOTE DETAILS
54 y/o F with PMH of HTN, DM presents with left sided flank pain x 2 days. Denies fever, chills, nausea, vomiting, constipation, diarrhea, urinary complaints. No change in pain with tylenol. PE: Well appearing. Cardiac: s1s2, RRR. lungs: CTAB. ABdomen: NBS x4, soft, nontender. +left CVAT. A/P: r/o kidney stone. Plan for labs, CT, analgesia, reassess. - Aaron Lee PA-C Labs and imaging reviewed with patient with assistance from patient's daughter acting as  at patient's request. CT consistent with mesenteric panniculitis. Pt with no evidence for bowel obstruction. Advised close GI/PCP follow up for further management which may include prolonged steroid taper. Will dc with GI referral, analgesia. Patient and family agreeable to plan. - Aaron Lee PA-C

## 2023-07-14 NOTE — ED STATDOCS - PATIENT PORTAL LINK FT
You can access the FollowMyHealth Patient Portal offered by [facility] by registering at the following website: [email or web address]. By joining [employer]’s FollowMyHealth portal, you will also be able to view your health information using other applications (apps) compatible with our system.

## 2023-07-14 NOTE — ED ADULT TRIAGE NOTE - CHIEF COMPLAINT QUOTE
PT C/O LEFT FLANK PAIN RADIATING TO ABDOMEN X 3DAYS WORSENING TODAY. DENIES CP/SOB/D/V/D/FEVER/CHILLS. NO SIGNIFICANT PMH.

## 2023-07-14 NOTE — ED STATDOCS - OBJECTIVE STATEMENT
54 y/o female with PMHx of HTN, DM, pyelonephritis presents to the ED c/o left sided flank pain radiating into abdomen worsening today. Patient endorses feeling weak. Patient denies vomiting, dysuria. Patient has been taking Tylenol for pain.

## 2023-07-14 NOTE — ED ADULT NURSE NOTE - OBJECTIVE STATEMENT
PT presents to ED c/o left sided flank pain radiating to abdomen X 3 days. Denies nvd, cp, change in bowel or bladder habits, no significant medical hx. PT skin color appropriate for race, respirations even and unlabored. ED workup in progress, will continue to monitor. Jm Iglesias RN

## 2023-07-14 NOTE — ED STATDOCS - CLINICAL SUMMARY MEDICAL DECISION MAKING FREE TEXT BOX
Mild leukocytosis.  Other labs WNl.  CT with mesenteric panniculitis, unchanged.  Well on reevaluation.  D/c home with supportive care, f/u with PCP, strict return precautions.

## 2023-07-14 NOTE — ED ADULT NURSE NOTE - NSFALLUNIVINTERV_ED_ALL_ED
Bed/Stretcher in lowest position, wheels locked, appropriate side rails in place/Call bell, personal items and telephone in reach/Instruct patient to call for assistance before getting out of bed/chair/stretcher/Non-slip footwear applied when patient is off stretcher/[place] to call system/Physically safe environment - no spills, clutter or unnecessary equipment/Purposeful proactive rounding/Room/bathroom lighting operational, light cord in reach

## 2023-07-16 LAB
CULTURE RESULTS: SIGNIFICANT CHANGE UP
SPECIMEN SOURCE: SIGNIFICANT CHANGE UP

## 2023-07-17 ENCOUNTER — EMERGENCY (EMERGENCY)
Facility: HOSPITAL | Age: 53
LOS: 0 days | Discharge: HOME | End: 2023-07-17
Attending: STUDENT IN AN ORGANIZED HEALTH CARE EDUCATION/TRAINING PROGRAM
Payer: MEDICAID

## 2023-07-17 VITALS — WEIGHT: 164.02 LBS | HEIGHT: 64 IN

## 2023-07-17 VITALS
DIASTOLIC BLOOD PRESSURE: 78 MMHG | SYSTOLIC BLOOD PRESSURE: 116 MMHG | HEART RATE: 65 BPM | RESPIRATION RATE: 16 BRPM | TEMPERATURE: 98 F | OXYGEN SATURATION: 98 %

## 2023-07-17 DIAGNOSIS — K65.4 SCLEROSING MESENTERITIS: ICD-10-CM

## 2023-07-17 DIAGNOSIS — R30.0 DYSURIA: ICD-10-CM

## 2023-07-17 DIAGNOSIS — I10 ESSENTIAL (PRIMARY) HYPERTENSION: ICD-10-CM

## 2023-07-17 DIAGNOSIS — Z79.82 LONG TERM (CURRENT) USE OF ASPIRIN: ICD-10-CM

## 2023-07-17 DIAGNOSIS — R10.9 UNSPECIFIED ABDOMINAL PAIN: ICD-10-CM

## 2023-07-17 DIAGNOSIS — Z79.84 LONG TERM (CURRENT) USE OF ORAL HYPOGLYCEMIC DRUGS: ICD-10-CM

## 2023-07-17 DIAGNOSIS — E11.9 TYPE 2 DIABETES MELLITUS WITHOUT COMPLICATIONS: ICD-10-CM

## 2023-07-17 DIAGNOSIS — M54.9 DORSALGIA, UNSPECIFIED: ICD-10-CM

## 2023-07-17 DIAGNOSIS — R11.2 NAUSEA WITH VOMITING, UNSPECIFIED: ICD-10-CM

## 2023-07-17 DIAGNOSIS — Z90.710 ACQUIRED ABSENCE OF BOTH CERVIX AND UTERUS: Chronic | ICD-10-CM

## 2023-07-17 DIAGNOSIS — Z90.710 ACQUIRED ABSENCE OF BOTH CERVIX AND UTERUS: ICD-10-CM

## 2023-07-17 DIAGNOSIS — R51.9 HEADACHE, UNSPECIFIED: ICD-10-CM

## 2023-07-17 DIAGNOSIS — H53.8 OTHER VISUAL DISTURBANCES: ICD-10-CM

## 2023-07-17 LAB
ALBUMIN SERPL ELPH-MCNC: 3.3 G/DL — SIGNIFICANT CHANGE UP (ref 3.3–5)
ALP SERPL-CCNC: 72 U/L — SIGNIFICANT CHANGE UP (ref 40–120)
ALT FLD-CCNC: 16 U/L — SIGNIFICANT CHANGE UP (ref 12–78)
ANION GAP SERPL CALC-SCNC: 3 MMOL/L — LOW (ref 5–17)
APPEARANCE UR: CLEAR — SIGNIFICANT CHANGE UP
AST SERPL-CCNC: 13 U/L — LOW (ref 15–37)
BASOPHILS # BLD AUTO: 0.04 K/UL — SIGNIFICANT CHANGE UP (ref 0–0.2)
BASOPHILS NFR BLD AUTO: 0.4 % — SIGNIFICANT CHANGE UP (ref 0–2)
BILIRUB SERPL-MCNC: 0.4 MG/DL — SIGNIFICANT CHANGE UP (ref 0.2–1.2)
BILIRUB UR-MCNC: NEGATIVE — SIGNIFICANT CHANGE UP
BUN SERPL-MCNC: 8 MG/DL — SIGNIFICANT CHANGE UP (ref 7–23)
CALCIUM SERPL-MCNC: 9 MG/DL — SIGNIFICANT CHANGE UP (ref 8.5–10.1)
CHLORIDE SERPL-SCNC: 104 MMOL/L — SIGNIFICANT CHANGE UP (ref 96–108)
CO2 SERPL-SCNC: 30 MMOL/L — SIGNIFICANT CHANGE UP (ref 22–31)
COLOR SPEC: YELLOW — SIGNIFICANT CHANGE UP
CREAT SERPL-MCNC: 0.7 MG/DL — SIGNIFICANT CHANGE UP (ref 0.5–1.3)
DIFF PNL FLD: NEGATIVE — SIGNIFICANT CHANGE UP
EGFR: 104 ML/MIN/1.73M2 — SIGNIFICANT CHANGE UP
EOSINOPHIL # BLD AUTO: 0.25 K/UL — SIGNIFICANT CHANGE UP (ref 0–0.5)
EOSINOPHIL NFR BLD AUTO: 2.6 % — SIGNIFICANT CHANGE UP (ref 0–6)
EPI CELLS # UR: SIGNIFICANT CHANGE UP
GLUCOSE SERPL-MCNC: 114 MG/DL — HIGH (ref 70–99)
GLUCOSE UR QL: NEGATIVE — SIGNIFICANT CHANGE UP
HCG SERPL-ACNC: 2 MIU/ML — SIGNIFICANT CHANGE UP
HCT VFR BLD CALC: 41 % — SIGNIFICANT CHANGE UP (ref 34.5–45)
HGB BLD-MCNC: 13.5 G/DL — SIGNIFICANT CHANGE UP (ref 11.5–15.5)
IMM GRANULOCYTES NFR BLD AUTO: 1.2 % — HIGH (ref 0–0.9)
KETONES UR-MCNC: NEGATIVE — SIGNIFICANT CHANGE UP
LEUKOCYTE ESTERASE UR-ACNC: ABNORMAL
LIDOCAIN IGE QN: 136 U/L — SIGNIFICANT CHANGE UP (ref 73–393)
LYMPHOCYTES # BLD AUTO: 2.44 K/UL — SIGNIFICANT CHANGE UP (ref 1–3.3)
LYMPHOCYTES # BLD AUTO: 25.7 % — SIGNIFICANT CHANGE UP (ref 13–44)
MCHC RBC-ENTMCNC: 29.5 PG — SIGNIFICANT CHANGE UP (ref 27–34)
MCHC RBC-ENTMCNC: 32.9 GM/DL — SIGNIFICANT CHANGE UP (ref 32–36)
MCV RBC AUTO: 89.5 FL — SIGNIFICANT CHANGE UP (ref 80–100)
MONOCYTES # BLD AUTO: 0.65 K/UL — SIGNIFICANT CHANGE UP (ref 0–0.9)
MONOCYTES NFR BLD AUTO: 6.9 % — SIGNIFICANT CHANGE UP (ref 2–14)
NEUTROPHILS # BLD AUTO: 5.99 K/UL — SIGNIFICANT CHANGE UP (ref 1.8–7.4)
NEUTROPHILS NFR BLD AUTO: 63.2 % — SIGNIFICANT CHANGE UP (ref 43–77)
NITRITE UR-MCNC: NEGATIVE — SIGNIFICANT CHANGE UP
PH UR: 8 — SIGNIFICANT CHANGE UP (ref 5–8)
PLATELET # BLD AUTO: 351 K/UL — SIGNIFICANT CHANGE UP (ref 150–400)
POTASSIUM SERPL-MCNC: 4.5 MMOL/L — SIGNIFICANT CHANGE UP (ref 3.5–5.3)
POTASSIUM SERPL-SCNC: 4.5 MMOL/L — SIGNIFICANT CHANGE UP (ref 3.5–5.3)
PROT SERPL-MCNC: 8.1 GM/DL — SIGNIFICANT CHANGE UP (ref 6–8.3)
PROT UR-MCNC: NEGATIVE — SIGNIFICANT CHANGE UP
RBC # BLD: 4.58 M/UL — SIGNIFICANT CHANGE UP (ref 3.8–5.2)
RBC # FLD: 12.4 % — SIGNIFICANT CHANGE UP (ref 10.3–14.5)
RBC CASTS # UR COMP ASSIST: NEGATIVE /HPF — SIGNIFICANT CHANGE UP (ref 0–4)
SODIUM SERPL-SCNC: 137 MMOL/L — SIGNIFICANT CHANGE UP (ref 135–145)
SP GR SPEC: 1.01 — SIGNIFICANT CHANGE UP (ref 1.01–1.02)
UROBILINOGEN FLD QL: NEGATIVE — SIGNIFICANT CHANGE UP
WBC # BLD: 9.48 K/UL — SIGNIFICANT CHANGE UP (ref 3.8–10.5)
WBC # FLD AUTO: 9.48 K/UL — SIGNIFICANT CHANGE UP (ref 3.8–10.5)
WBC UR QL: SIGNIFICANT CHANGE UP /HPF (ref 0–5)

## 2023-07-17 PROCEDURE — 99284 EMERGENCY DEPT VISIT MOD MDM: CPT | Mod: 25

## 2023-07-17 PROCEDURE — 99284 EMERGENCY DEPT VISIT MOD MDM: CPT

## 2023-07-17 PROCEDURE — 80053 COMPREHEN METABOLIC PANEL: CPT

## 2023-07-17 PROCEDURE — 36415 COLL VENOUS BLD VENIPUNCTURE: CPT

## 2023-07-17 PROCEDURE — 83690 ASSAY OF LIPASE: CPT

## 2023-07-17 PROCEDURE — 76770 US EXAM ABDO BACK WALL COMP: CPT

## 2023-07-17 PROCEDURE — 81001 URINALYSIS AUTO W/SCOPE: CPT

## 2023-07-17 PROCEDURE — 85025 COMPLETE CBC W/AUTO DIFF WBC: CPT

## 2023-07-17 PROCEDURE — 84702 CHORIONIC GONADOTROPIN TEST: CPT

## 2023-07-17 PROCEDURE — 96375 TX/PRO/DX INJ NEW DRUG ADDON: CPT

## 2023-07-17 PROCEDURE — 96374 THER/PROPH/DIAG INJ IV PUSH: CPT

## 2023-07-17 PROCEDURE — 87086 URINE CULTURE/COLONY COUNT: CPT

## 2023-07-17 PROCEDURE — 76770 US EXAM ABDO BACK WALL COMP: CPT | Mod: 26

## 2023-07-17 RX ORDER — SODIUM CHLORIDE 9 MG/ML
1000 INJECTION INTRAMUSCULAR; INTRAVENOUS; SUBCUTANEOUS ONCE
Refills: 0 | Status: COMPLETED | OUTPATIENT
Start: 2023-07-17 | End: 2023-07-17

## 2023-07-17 RX ORDER — ACETAMINOPHEN 500 MG
1000 TABLET ORAL ONCE
Refills: 0 | Status: COMPLETED | OUTPATIENT
Start: 2023-07-17 | End: 2023-07-17

## 2023-07-17 RX ORDER — METOCLOPRAMIDE HCL 10 MG
10 TABLET ORAL ONCE
Refills: 0 | Status: COMPLETED | OUTPATIENT
Start: 2023-07-17 | End: 2023-07-17

## 2023-07-17 RX ADMIN — Medication 400 MILLIGRAM(S): at 11:37

## 2023-07-17 RX ADMIN — Medication 10 MILLIGRAM(S): at 11:37

## 2023-07-17 RX ADMIN — SODIUM CHLORIDE 1000 MILLILITER(S): 9 INJECTION INTRAMUSCULAR; INTRAVENOUS; SUBCUTANEOUS at 11:37

## 2023-07-17 NOTE — ED PROVIDER NOTE - NS_ ATTENDINGSCRIBEDETAILS _ED_A_ED_FT
I, Rocael Peacock DO,  performed the initial face to face bedside interview with this patient regarding history of present illness, review of symptoms and relevant past medical, social and family history.  I completed an independent physical examination.  I was the initial provider who evaluated this patient.   I personally saw the patient and performed a substantive portion of the visit including all aspects of the medical decision making.  The history, relevant review of systems, past medical and surgical history, medical decision making, and physical examination was documented by the scribe in my presence and I attest to the accuracy of the documentation.

## 2023-07-17 NOTE — ED PROVIDER NOTE - OBJECTIVE STATEMENT
used, id# [id number]  52 y/o female with PMHx of HTN, DM, pyelonephritis 4 years ago, hysterectomy presents to the ED for back pain unrelieved with medication, right-sided headache associated with blurry right vision, dysuria, and nausea/vomiting since yesterday afternoon, only able to tolerate liquid PO intake. States the back pain started while she was trying to get out of bed. Denies recent travel. Patient was seen in ED 3 days ago for lower back pain, discharged with dx of mesenteric panniculitis and advised to followup with GI. She was given pain medication on discharge, which patient states she has been taking without relief (last taken 10PM).

## 2023-07-17 NOTE — ED PROVIDER NOTE - CLINICAL SUMMARY MEDICAL DECISION MAKING FREE TEXT BOX
54 y/o female presents with right-sided headache, neurologically intact. Will treat conservatively. Left-sided back pain, atraumatic. Had CT scan last week; will check labs and UA.

## 2023-07-17 NOTE — ED PROVIDER NOTE - NSFOLLOWUPINSTRUCTIONS_ED_ALL_ED_FT
Acute Headache    WHAT YOU NEED TO KNOW:    An acute headache is pain or discomfort that starts suddenly and gets worse quickly. You may have an acute headache only when you feel stress or eat certain foods. Other acute headache pain can happen every day, and sometimes several times a day.     DISCHARGE INSTRUCTIONS:    Seek care immediately if:   •You have severe pain.      •You have numbness or weakness on one side of your face or body.      •You have a headache that occurs after a blow to the head, a fall, or other trauma.       •You have a headache, are forgetful or confused, or have trouble speaking.      •You have a headache, stiff neck, and a fever.      Contact your healthcare provider if:   •You have a constant headache and are vomiting.      •You have a headache each day that does not get better, even after treatment.      •You have changes in your headaches, or new symptoms that occur when you have a headache.      •You have questions or concerns about your condition or care.      Medicines: You may need any of the following:   •Prescription pain medicine may be given. The medicine your healthcare provider recommends will depend on the kind of headaches you have. You will need to take prescription headache medicines as directed to prevent a problem called rebound headache. These headaches happen with regular use of pain relievers for headache disorders.      •NSAIDs, such as ibuprofen, help decrease swelling, pain, and fever. This medicine is available with or without a doctor's order. NSAIDs can cause stomach bleeding or kidney problems in certain people. If you take blood thinner medicine, always ask your healthcare provider if NSAIDs are safe for you. Always read the medicine label and follow directions.      •Acetaminophen decreases pain and fever. It is available without a doctor's order. Ask how much to take and how often to take it. Follow directions. Read the labels of all other medicines you are using to see if they also contain acetaminophen, or ask your doctor or pharmacist. Acetaminophen can cause liver damage if not taken correctly. Do not use more than 3 grams (3,000 milligrams) total of acetaminophen in one day.       •Antidepressants may be given for some kinds of headaches.       •Take your medicine as directed. Contact your healthcare provider if you think your medicine is not helping or if you have side effects. Tell him or her if you are allergic to any medicine. Keep a list of the medicines, vitamins, and herbs you take. Include the amounts, and when and why you take them. Bring the list or the pill bottles to follow-up visits. Carry your medicine list with you in case of an emergency.      Manage your symptoms:   •Apply heat or ice on the headache area. Use a heat or ice pack. For an ice pack, you can also put crushed ice in a plastic bag. Cover the pack or bag with a towel before you apply it to your skin. Ice and heat both help decrease pain, and heat also helps decrease muscle spasms. Apply heat for 20 to 30 minutes every 2 hours. Apply ice for 15 to 20 minutes every hour. Apply heat or ice for as long and for as many days as directed. You may alternate heat and ice.      •Relax your muscles. Lie down in a comfortable position and close your eyes. Relax your muscles slowly. Start at your toes and work your way up your body.      •Keep a record of your headaches. Write down when your headaches start and stop. Include your symptoms and what you were doing when the headache began. Record what you ate or drank for 24 hours before the headache started. Describe the pain and where it hurts. Keep track of what you did to treat your headache and if it worked.       Prevent an acute headache:   •Avoid anything that triggers an acute headache. Examples include exposure to chemicals, going to high altitude, or not getting enough sleep. Create a regular sleep routine. Go to sleep at the same time and wake up at the same time each day. Do not use electronic devices before bedtime. These may trigger a headache or prevent you from sleeping well.      •Do not smoke. Nicotine and other chemicals in cigarettes and cigars can trigger an acute headache or make it worse. Ask your healthcare provider for information if you currently smoke and need help to quit. E-cigarettes or smokeless tobacco still contain nicotine. Talk to your healthcare provider before you use these products.       •Limit alcohol as directed. Alcohol can trigger an acute headache or make it worse. If you have cluster headaches, do not drink alcohol during an episode. For other types of headaches, ask your healthcare provider if it is safe for you to drink alcohol. Ask how much is safe for you to drink, and how often.      •Exercise as directed. Exercise can reduce tension and help with headache pain. Aim for 30 minutes of physical activity on most days of the week. Your healthcare provider can help you create an exercise plan.      •Eat a variety of healthy foods. Healthy foods include fruits, vegetables, low-fat dairy products, lean meats, fish, whole grains, and cooked beans. Your healthcare provider or dietitian can help you create meals plans if you need to avoid foods that trigger headaches.      Follow up with your healthcare provider as directed: Bring your headache record with you when you see your healthcare provider. Write down your questions so you remember to ask them during your visits.      Dolor de brent earl    LO QUE NECESITA SABER:    El dolor de brent earl es un dolor o molestia que comienza de repente y empeora rápidamente. Usted puede tener un dolor de brent earl sólo cuando siente estrés o come ciertos alimentos. Otro tipo dolor de brent earl puede producirse todos los días y a veces varias veces al día.    INSTRUCCIONES SOBRE EL QUINN HOSPITALARIA:    Busque atención médica de inmediato si:  •Usted tiene dolor intenso.      •Usted tiene entumecimiento en un lado de jones betzaida o cuerpo.      •Usted tiene un dolor de brent que ocurre después de un golpe en la brent, paula caída u otro trauma.      •Tiene dolor de brent, está olvidadizo o confundido o tiene dificultad para hablar.      •Tiene dolor de brent, rigidez en el mookie y fiebre.      Comuníquese con jones médico si:  •Usted tiene un dolor de brent britney y está vomitando.      •Usted tiene dolor de brent todos los días y no se elvia aun después de tratarlo.      •Lisa kim de brent cambian u ocurren nuevos síntomas cuando tiene dolor de brent.      •Usted tiene preguntas o inquietudes acerca de jones condición o cuidado.      Medicamentos:Es posible que usted necesite alguno de los siguientes:   •Los analgésicos recetadospodrían administrarse. El medicamento que recomienda jones médico dependerá del tipo de dolor de brent que tenga. Usted necesitará joaquin medicamentos para el dolor de brent según las indicaciones para evitar un problema llamado dolor de brent de rebote. Estos kim de brent ocurren con el uso regular de analgésicos para los trastornos de dolor de brent.      •Los OSCAR,nabor el ibuprofeno, ayudan a disminuir la inflamación, el dolor y la fiebre. Linda medicamento está disponible con o sin paula receta médica. Los OSCAR pueden causar sangrado estomacal o problemas renales en ciertas personas. Si usted john un medicamento anticoagulante, siempre pregúntele a jones médico si los OSCAR son seguros para usted. Siempre vicki la etiqueta de linda medicamento y siga las instrucciones.      •Acetaminofénalivia el dolor y baja la fiebre. Está disponible sin receta médica. Pregunte la cantidad y la frecuencia con que debe tomarlos. Siga las indicaciones. Vicki las etiquetas de todos los demás medicamentos que esté usando para saber si también contienen acetaminofén, o pregunte a jones médico o farmacéutico. El acetaminofén puede causar daño en el hígado cuando no se john de forma correcta. No use más de 3 gramos (3,000 miligramos) en total de acetaminofeno en un día.      •Antidepresivosse pueden administrar para algunos tipos de kim de brent.      •[place] lisa medicamentos nabor se le haya indicado.Consulte con jones médico si usted rafia que jones medicamento no le está ayudando o si presenta efectos secundarios. Infórmele si es alérgico a cualquier medicamento. Mantenga paula lista actualizada de los medicamentos, las vitaminas y los productos herbales que john. Incluya los siguientes datos de los medicamentos: cantidad, frecuencia y motivo de administración. Traiga con usted la lista o los envases de las píldoras a lisa citas de seguimiento. Lleve la lista de los medicamentos con usted en valery de paula emergencia.      El manejo de lisa síntomas:  •Aplique hielo o caloren la yury donde jones hijo siente el dolor de brent. Utilice un paquete (compresa) de hielo o calor. Para un paquete de hielo, también puede colocar hielo molido en paula bolsa plástica. Cubra el paquete de hielo o la bolsa con paula toalla pequeña antes de aplicarla en la piel. Tanto el hielo nabor el calor ayudan a reducir el dolor, y el calor también contribuye a reducir los espasmos musculares. Aplique calor america 20 a 30 minutos cada 2 horas. Aplique hielo america 15 a 20 minutos cada hora. Aplique calor o hielo america el tiempo y la cantidad de días que se le indique. Usted puede alternar el calor y el hielo.      •Relaje lisa músculos.Acuéstese en paula posición cómoda y cierre lisa ojos. Relaje lisa músculos lentamente. Comience por los dedos de los pies y avance hacia arriba al steven de jones cuerpo.      •Registre en un diario lisa kim de brent.Escriba cuándo comienzan y terminan lisa migrañas. Incluya lisa síntomas y qué estaba haciendo cuando comenzó la migraña. Registre lo que comió y lo que tomó las 24 horas previas al comienzo de jones migraña. Describa el dolor y dónde le duele: Lleve un registro de lo que hizo para tratar jones migraña y si obtuvo un resultado satisfactorio.      Cómo prevenir un dolor de brent earl:  •Evite cualquier cosa que provoque un dolor de brent earl.Los ejemplos incluyen la exposición a sustancias químicas, las grandes altitudes o no dormir lo suficiente. Rafia paula rutina para dormir. Acuéstese y levántese todos los días a la misma hora. No utilice aparatos electrónicos antes de acostarse. Pueden provocarle un dolor de brent o impedirle dormir xavi.      •No fume.La nicotina y otras sustancias químicas en los cigarrillos y puros pueden desencadenar un dolor de brent earl o empeorarlo. Pida información a jones médico si usted actualmente fuma y necesita ayuda para dejar de fumar. Los cigarrillos electrónicos o el tabaco sin humo igualmente contienen nicotina. Consulte con jones médico antes de utilizar estos productos.      •Limite el consumo de alcohol según le indicaron.El alcohol puede provocar un dolor de brent earl o empeorarlo. Si usted tiene kim de bretn de racimo, no main alcohol america un episodio. Para otros tipos de kim de brent, pregúntele a jones proveedor de atención médica si es seguro para usted beber alcohol. Pregunte cuál es la cantidad العلي que puede beber y con qué frecuencia.      •Ejercítese según indicaciones.El ejercicio puede reducir la tensión y ayudarlo a aliviar el dolor de brent. Propóngase hacer 30 minutos de actividad física maría todos los días de la semana. Jones médico puede ayudarle a crear un plan de ejercicios.      •Consuma alimentos saludables y variados.Los alimentos saludables incluyen las frutas, verduras, productos lácteos bajos en grasa, jonny magras, pescado y frijoles cocidos. Jones médico o dietista puede ayudarle a crear planes de comidas si desea evitar los alimentos que provocan kim de brent.      Acuda a lisa consultas de control con jones médico según le indicaron.Traiga jones registro de kim de brent con usted cuando visite a jones médico. Anote lisa preguntas para que se acuerde de hacerlas america lisa visitas.

## 2023-07-17 NOTE — ED ADULT TRIAGE NOTE - CHIEF COMPLAINT QUOTE
Pt presents to er with complaints of feeling nauseated with episodes of emesis, back pain and headache, denies fevers, diarrhea.

## 2023-07-17 NOTE — ED ADULT NURSE NOTE - OBJECTIVE STATEMENT
- Trace #[id number]. Pt is a 53yr old female, A&OX4 and ambulatory, c/o L flank pain radiating to the LLQ since Friday. Endorses nausea, vomiting, headache, and dysuria. Resp. even and unlabored. Denies fever, dizziness, and vision changes. Labs sent. In NAD.

## 2023-07-17 NOTE — ED PROVIDER NOTE - NSCAREINITIATED _GEN_ER
Podiatric Surgery Consult Note    Consults     History Of Present Illness  Ray is a 51 year old male with PMHx of diabetes mellitus, HTN, HLD,  Patient well known to dr. Flowers, being seeing in outpatient clinic for a plantar left foot wound, admitted by Dr. Sandhu  from outpatient clinic for a cellulitis to the left leg. Patient states he noticed the redness occur about three days ago Does state he had intermittent chills and low grade fevers at home. Did start clindamycin at home without improvement. He was sent to the ER for further management.  Pt had an injury with abrasion anterior left shin a few days prior to having the cellulitis.    Past Medical History  Past Medical History:   Diagnosis Date   • A-fib (CMS/Formerly [facility])    • Cellulitis and abscess of leg, except foot     right leg  has wound vac   • Chronic pain    • Coronary artery disease    • Decreased cardiac ejection fraction    • DM (diabetes mellitus) (CMS/Formerly [facility])    • Gastroesophageal reflux disease    • H/O transfusion of whole blood    • History of group B Streptococcus (GBS) infection 2014   • HTN (hypertension)    • Hyperlipidemia    • Ischemic cardiomyopathy    • MRSA (methicillin resistant Staphylococcus aureus) 2014   • Myocardial infarction (CMS/Formerly [facility])    • Open wound of left thigh    • Penetrating foot wound     left   • Sleep apnea     uses cpap    • SVT (supraventricular tachycardia) (CMS/Formerly [facility])         Surgical History  Past Surgical History:   Procedure Laterality Date   • Cardiac surgery  2018   • Coronary artery bypass graft N/A 05/08/2018    Quadruple -- Dr. Ray Escobar   • Debridement Left 09/25/2020    Excisional debridement wound of left proximal medial thigh and foot, Wound bed preparation of chronic wound of left proximal medial foot, Split thickness skin graft to left medial thigh and plantar aspect of foot, Placement of negative pressure wound VAC therapy to grafted wounds of left medial thigh and foot. --  Dr. Christofer Goins    • Foot surgery Left     has wound vac   • Iabp N/A 11/28/2018    3 Vessel disease and IABP   • Incision and drainage Left 05/29/2020    Incision and drainage left lower extremity (left thigh and foot) -- Dr. Christofer Goins    • Incision and drainage Left 05/30/2020    Incision and drainage left lower extremity w/ wound vac placement -- Dr. Christofer Goins    • Infected skin debridement Right 06/07/2019    Incision and debridement of right medial proximal thigh vein harvest site abscess -- Dr. Christofer Goins   • Lower extremity angiogram  08/03/2021   • Outpatient surgery  11/08/2014    Cellulitis surgery to chin   • Pacemaker implant N/A 01/10/2019   • Tonsillectomy          Social History  Social History     Tobacco Use   • Smoking status: Never Smoker   • Smokeless tobacco: Never Used   Vaping Use   • Vaping Use: never used   Substance Use Topics   • Alcohol use: Never   • Drug use: Never       Family History    Family History   Problem Relation Age of Onset   • Atrial Fibrilliation Mother    • Coronary Artery Disease Father         CABG   • Diabetes Father         Allergies  ALLERGIES:  Ampicillin    Medications  Medications Prior to Admission   Medication Sig Dispense Refill   • gabapentin (NEURONTIN) 100 MG capsule Take 100 mg by mouth 3 times daily.     • Semaglutide, 1 MG/DOSE, (Ozempic, 1 MG/DOSE,) 4 MG/3ML Solution Pen-injector Inject 1 mg into the skin every 7 days. On Thursday     • ibuprofen (MOTRIN) 200 MG tablet Take 600 mg by mouth as needed for Pain.     • ivabradine (CORLANOR) 5 MG tablet Take 5 mg by mouth 2 times daily.     • sacubitril-valsartan (ENTRESTO)  MG per tablet Take 1 tablet by mouth 2 times daily.     • metoPROLOL succinate (TOPROL-XL) 200 MG 24 hr tablet Take 200 mg by mouth at bedtime.      • ergocalciferol (DRISDOL) 1.25 mg (50,000 units) capsule Take 1.25 mg by mouth 1 day a week. On Sunday     • aspirin (ASPIRIN 81) 81 MG chewable tablet Chew 1 tablet by mouth  daily. Do not start before Abbi 10, 2020. 30 tablet 3   • atorvastatin (LIPITOR) 80 MG tablet Take 1 tablet by mouth daily. Do not start before Abbi 10, 2020. (Patient taking differently: Take 80 mg by mouth at bedtime. ) 30 tablet 3   • spironolactone (ALDACTONE) 25 MG tablet Take 1 tablet by mouth daily. Do not start before Abbi 10, 2020. (Patient taking differently: Take 25 mg by mouth every morning. ) 30 tablet 3   • bumetanide (BUMEX) 2 MG tablet Take 1 tablet by mouth 2 times daily. 60 tablet 3   • insulin aspart (NOVOLOG) 100 UNIT/ML sliding scale injection Inject 12 Units into the skin 3 times daily (before meals). Sliding scale 10 mL 12   • insulin glargine (BASAGLAR KWIKPEN) 100 UNIT/ML pen-injector Inject 44 Units into the skin 2 times daily. 15 mL 12   • ELIZABETH CONTOUR NEXT TEST test strip 3 times daily.     • BD Pen Needle Nida 2nd Gen 32G X 4 MM Misc U ONCE Q 7 DAYS         Review of Systems:  Constitutional: Negative except as documented in history of present illness.  Eye: Negative except as documented in history of present illness.  Ear/Nose/Mouth/Throat: Negative except as documented in history of present illness.  Cardiovascular: Negative except as documented in history of present illness.  Respiratory: Negative except as documented in history of present illness.  Gastrointestinal: Negative except as documented in history of present illness.  Genitourinary: Negative except as documented in history of present illness.  Musculoskeletal: Negative except as documented in history of present illness.  Integumentary: Negative except as documented in history of present illness.  Hematology/Lymphatics: Negative except as documented in history of present illness.  Neurologic: Negative except as documented in history of present illness.  Endocrine: Negative except as documented in history of present illness.  Psychiatric: Negative except as documented in history of present illness.       Last Recorded  Vitals  Blood pressure 92/53, pulse 62, temperature 97.9 °F (36.6 °C), temperature source Oral, resp. rate 16, height 6' 3\" (1.905 m), weight (!) 169.4 kg (373 lb 7.4 oz), SpO2 97 %.    Physical Exam  General: Alert and oriented x 3, no acute distress  Cardiovascular: Pedal pulses: Dorsalis pedis and posterior tibial arteries nonpalpable bilateral. There is edema to the left lower extremity and right lower extremity   Muscle tone & strength: wnl bilaterally. Mild tenderness with posterior calf squeeze, left foot.   Integumentary: Left plantar foot ulceration with a mixed fibrogranular base, +serous drainage, no purulence upon compression, no malodor.  Patient has cellulitis of the left lower extremity extending from the ankle just below the knee.  This is improved since being on IV antibiotics were previously extended up to the groin.  The ulcer plantar aspect of the foot continues very clean and continues to slowly decrease in size.  There is an abrasion on the anterior aspect of the left shin measuring 2 cm in diameter which is superficial with granular tissue.    Labs     Admission on 10/12/2021   Component Date Value Ref Range Status   • Culture, Blood or Bone Marrow 10/12/2021 No Growth 1 Day.   Preliminary   • Culture, Blood or Bone Marrow 10/12/2021 No Growth 1 Day.   Preliminary   • Extra Tube 10/12/2021 Hold for Add Ons   Final   • Extra Tube 10/12/2021 Hold for Add Ons   Final   • Extra Tube 10/12/2021 Hold for Add Ons   Final   • Extra Tube 10/12/2021 Hold for Add Ons   Final   • LACTIC ACID, VENOUS - RESPIRATORY 10/12/2021 0.7  <2.0 mmol/L Final   • Sodium 10/12/2021 135  135 - 145 mmol/L Final   • Potassium 10/12/2021 4.1  3.4 - 5.1 mmol/L Final   • Chloride 10/12/2021 102  98 - 107 mmol/L Final   • Carbon Dioxide 10/12/2021 26  21 - 32 mmol/L Final   • Anion Gap 10/12/2021 11  10 - 20 mmol/L Final   • Glucose 10/12/2021 120* 70 - 99 mg/dL Final   • BUN 10/12/2021 24* 6 - 20 mg/dL Final   • Creatinine  10/12/2021 1.28* 0.67 - 1.17 mg/dL Final   • Glomerular Filtration Rate 10/12/2021 65  >=60 Final    eGFR results = or >60 mL/min/1.73m2 = Normal kidney function. Estimated GFR calculated using the 2009 CKD-EPI creatinine equation.     • BUN/ Creatinine Ratio 10/12/2021 19  7 - 25 Final   • Calcium 10/12/2021 9.5  8.4 - 10.2 mg/dL Final   • WBC 10/12/2021 8.1  4.2 - 11.0 K/mcL Final   • RBC 10/12/2021 4.31* 4.50 - 5.90 mil/mcL Final   • HGB 10/12/2021 12.5* 13.0 - 17.0 g/dL Final   • HCT 10/12/2021 38.3* 39.0 - 51.0 % Final   • MCV 10/12/2021 88.9  78.0 - 100.0 fl Final   • MCH 10/12/2021 29.0  26.0 - 34.0 pg Final   • MCHC 10/12/2021 32.6  32.0 - 36.5 g/dL Final   • RDW-CV 10/12/2021 13.9  11.0 - 15.0 % Final   • RDW-SD 10/12/2021 45.2  39.0 - 50.0 fL Final   • PLT 10/12/2021 173  140 - 450 K/mcL Final   • NRBC 10/12/2021 0  <=0 /100 WBC Final   • Neutrophil, Percent 10/12/2021 76  % Final   • Lymphocytes, Percent 10/12/2021 13  % Final   • Mono, Percent 10/12/2021 7  % Final   • Eosinophils, Percent 10/12/2021 2  % Final   • Basophils, Percent 10/12/2021 1  % Final   • Immature Granulocytes 10/12/2021 1  % Final   • Absolute Neutrophils 10/12/2021 6.3  1.8 - 7.7 K/mcL Final   • Absolute Lymphocytes 10/12/2021 1.0  1.0 - 4.8 K/mcL Final   • Absolute Monocytes 10/12/2021 0.6  0.3 - 0.9 K/mcL Final   • Absolute Eosinophils  10/12/2021 0.2  0.0 - 0.5 K/mcL Final   • Absolute Basophils 10/12/2021 0.1  0.0 - 0.3 K/mcL Final   • Absolute Immmature Granulocytes 10/12/2021 0.0  0.0 - 0.2 K/mcL Final   • Magnesium 10/13/2021 2.6* 1.7 - 2.4 mg/dL Final   • Sodium 10/13/2021 137  135 - 145 mmol/L Final   • Potassium 10/13/2021 3.9  3.4 - 5.1 mmol/L Final   • Chloride 10/13/2021 105  98 - 107 mmol/L Final   • Carbon Dioxide 10/13/2021 29  21 - 32 mmol/L Final   • Anion Gap 10/13/2021 7* 10 - 20 mmol/L Final   • Glucose 10/13/2021 95  70 - 99 mg/dL Final   • BUN 10/13/2021 20  6 - 20 mg/dL Final   • Creatinine 10/13/2021 1.18*  0.67 - 1.17 mg/dL Final   • Glomerular Filtration Rate 10/13/2021 72  >=60 Final    eGFR results = or >60 mL/min/1.73m2 = Normal kidney function. Estimated GFR calculated using the 2009 CKD-EPI creatinine equation.     • BUN/ Creatinine Ratio 10/13/2021 17  7 - 25 Final   • Calcium 10/13/2021 8.6  8.4 - 10.2 mg/dL Final   • WBC 10/13/2021 6.6  4.2 - 11.0 K/mcL Final   • RBC 10/13/2021 4.03* 4.50 - 5.90 mil/mcL Final   • HGB 10/13/2021 11.7* 13.0 - 17.0 g/dL Final   • HCT 10/13/2021 36.0* 39.0 - 51.0 % Final   • MCV 10/13/2021 89.3  78.0 - 100.0 fl Final   • MCH 10/13/2021 29.0  26.0 - 34.0 pg Final   • MCHC 10/13/2021 32.5  32.0 - 36.5 g/dL Final   • PLT 10/13/2021 163  140 - 450 K/mcL Final   • RDW-CV 10/13/2021 13.8  11.0 - 15.0 % Final   • RDW-SD 10/13/2021 45.0  39.0 - 50.0 fL Final   • NRBC 10/13/2021 0  <=0 /100 WBC Final   • GLUCOSE, BEDSIDE - POINT OF CARE 10/12/2021 74  70 - 99 mg/dL Final   • GLUCOSE, BEDSIDE - POINT OF CARE 10/13/2021 95  70 - 99 mg/dL Final   • GLUCOSE, BEDSIDE - POINT OF CARE 10/13/2021 127* 70 - 99 mg/dL Final   • GLUCOSE, BEDSIDE - POINT OF CARE 10/13/2021 125* 70 - 99 mg/dL Final   • Sodium 10/14/2021 136  135 - 145 mmol/L Final   • Potassium 10/14/2021 3.8  3.4 - 5.1 mmol/L Final   • Chloride 10/14/2021 103  98 - 107 mmol/L Final   • Carbon Dioxide 10/14/2021 29  21 - 32 mmol/L Final   • Anion Gap 10/14/2021 8* 10 - 20 mmol/L Final   • Glucose 10/14/2021 134* 70 - 99 mg/dL Final   • BUN 10/14/2021 20  6 - 20 mg/dL Final   • Creatinine 10/14/2021 1.21* 0.67 - 1.17 mg/dL Final   • Glomerular Filtration Rate 10/14/2021 69  >=60 Final    eGFR results = or >60 mL/min/1.73m2 = Normal kidney function. Estimated GFR calculated using the 2009 CKD-EPI creatinine equation.     • BUN/ Creatinine Ratio 10/14/2021 17  7 - 25 Final   • Calcium 10/14/2021 8.6  8.4 - 10.2 mg/dL Final   • WBC 10/14/2021 4.9  4.2 - 11.0 K/mcL Final   • RBC 10/14/2021 3.82* 4.50 - 5.90 mil/mcL Final   • HGB 10/14/2021  11.1* 13.0 - 17.0 g/dL Final   • HCT 10/14/2021 34.1* 39.0 - 51.0 % Final   • MCV 10/14/2021 89.3  78.0 - 100.0 fl Final   • MCH 10/14/2021 29.1  26.0 - 34.0 pg Final   • MCHC 10/14/2021 32.6  32.0 - 36.5 g/dL Final   • RDW-CV 10/14/2021 14.0  11.0 - 15.0 % Final   • RDW-SD 10/14/2021 45.4  39.0 - 50.0 fL Final   • PLT 10/14/2021 157  140 - 450 K/mcL Final   • NRBC 10/14/2021 0  <=0 /100 WBC Final   • Neutrophil, Percent 10/14/2021 59  % Final   • Lymphocytes, Percent 10/14/2021 28  % Final   • Mono, Percent 10/14/2021 10  % Final   • Eosinophils, Percent 10/14/2021 2  % Final   • Basophils, Percent 10/14/2021 1  % Final   • Immature Granulocytes 10/14/2021 0  % Final   • Absolute Neutrophils 10/14/2021 2.9  1.8 - 7.7 K/mcL Final   • Absolute Lymphocytes 10/14/2021 1.4  1.0 - 4.8 K/mcL Final   • Absolute Monocytes 10/14/2021 0.5  0.3 - 0.9 K/mcL Final   • Absolute Eosinophils  10/14/2021 0.1  0.0 - 0.5 K/mcL Final   • Absolute Basophils 10/14/2021 0.0  0.0 - 0.3 K/mcL Final   • Absolute Immmature Granulocytes 10/14/2021 0.0  0.0 - 0.2 K/mcL Final   • GLUCOSE, BEDSIDE - POINT OF CARE 10/13/2021 143* 70 - 99 mg/dL Final   • GLUCOSE, BEDSIDE - POINT OF CARE 10/14/2021 124* 70 - 99 mg/dL Final       Imaging    [facility] LOWER EXTREMITY VENOUS DUPLEX LEFT   Final Result      1. There is no evidence of deep venous thrombosis in the left leg within   the constraints of suboptimal left calf vein visualization.      Electronically Signed by: SUKUMAR KNAPP M.D.    Signed on: 10/12/2021 8:04 PM              Impression:   1) Left plantar foot ulcer and left lower extremity cellulitis  2) DM  3) PAD s/p LLE angiogram with PTA peroneal artery  4) A fib  5) trauma with abrasion anterior left shin    Plan:   1) WBC WNL, afebrile,   2) Blood cultures pending, currently NGTD  3) Seen by Dr. Talbot, on IV Vancomycin ; appreciate reccomendations  4) Left plantar foot ulcer clinically appears clean, but aerobic/anaerobic culture  obtained in clinic. Will follow up on results.  The abrasion anterior aspect left shin also has granular tissue and superficial.  5) Hydrogel to plantar ulcer and abrasion anterior left shin daily per nursing staff  6) WBAT left foot in offloading surgical shoe  7) cellulitis has improved greatly since being on IV antibiotics.    Will cont to follow    Dimas Flowers DPM  10/14/2021               Rocael Peacock(Attending)

## 2023-07-17 NOTE — ED ADULT NURSE NOTE - NSFALLUNIVINTERV_ED_ALL_ED
Bed/Stretcher in lowest position, wheels locked, appropriate side rails in place/Call bell, personal items and telephone in reach/Instruct patient to call for assistance before getting out of bed/chair/stretcher/Non-slip footwear applied when patient is off stretcher/[place] to call system/Physically safe environment - no spills, clutter or unnecessary equipment/Purposeful proactive rounding/Room/bathroom lighting operational, light cord in reach

## 2023-07-17 NOTE — ED PROVIDER NOTE - PROGRESS NOTE DETAILS
Patient reassessed, states she is feeling much better.  Headache is resolved and abdominal pain feels significantly improved.  Patient is tolerating p.o.  Given supportive management outpatient and return precautions.  Discussed return precautions and all questions answered. Pt in agreement w/ plan. CAOx3, NAD, VSS. Stable for d/c.  [id number].

## 2023-07-17 NOTE — ED PROVIDER NOTE - PATIENT PORTAL LINK FT
You can access the FollowMyHealth Patient Portal offered by [facility] by registering at the following website: [email or web address]. By joining [facility]’s FollowMyHealth portal, you will also be able to view your health information using other applications (apps) compatible with our system.

## 2023-07-17 NOTE — ED PROVIDER NOTE - PHYSICAL EXAMINATION
GENERAL: A&Ox4, non-toxic appearing, no acute distress  HEENT: NCAT, EOMI, oral mucosa moist, normal conjunctiva  RESP: CTAB, no respiratory distress, no wheezes/rhonchi/rales, speaking in full sentences  CV: RRR, no murmurs/rubs/gallops  ABDOMEN: soft, non-tender, non-distended, no guarding, no CVA tenderness  MSK: no visible deformities  NEURO: no focal sensory or motor deficits, CN 2-12 grossly intact  SKIN: warm, normal color, well perfused, no rash  PSYCH: normal affect

## 2023-07-17 NOTE — ED ADULT TRIAGE NOTE - NS ED TRIAGE AVPU SCALE
Alert-The patient is alert, awake and responds to voice. The patient is oriented to time, place, and person. The triage nurse is able to obtain subjective information. Debridement Text: The wound edges were debrided prior to proceeding with the closure to facilitate wound healing.

## 2023-07-18 LAB
CULTURE RESULTS: SIGNIFICANT CHANGE UP
SPECIMEN SOURCE: SIGNIFICANT CHANGE UP

## 2023-07-26 NOTE — ED PROVIDER NOTE - PRINCIPAL DIAGNOSIS
Headache Home Suture Removal Text: Patient was provided instructions on removing sutures and will remove their sutures at home.  If they have any questions or difficulties they will call the office.

## 2023-08-28 NOTE — ED ADULT NURSE NOTE - CAS EDP DISCH DISPOSITION ADMI
Detail Level: Detailed
Initiate Treatment: Thin layer 5-fu around upper lip area BID x 2 weeks in Fall or Winter
Continue Regimen: Apply a thin layer to feet BID for one month then one week every month for maintenance
Initiate Treatment: Apply hydrocortisone 2.5% cream to groin area twice daily for up to one week, stop for one week, & repeat PRN
Telemetry

## 2023-11-09 NOTE — ED ADULT NURSE NOTE - PMH
Subjective:     CC:   Chief Complaint   Patient presents with    Annual Wellness Visit     BSA health form       HPI:   Kaia Larson is a 54 y.o. female who presents for annual exam. She is feeling well and denies any complaints.    Ob-Gyn/ History:    Patient has GYN provider: no  /Para:    Last Pap Smear:  . + history of abnormal pap smears, - HPV and cellular changes  Gyn Surgery:  None.  Current Contraceptive Method:  None. Is currently sexually active.  Last menstrual period:  2018.    No significant bloating/fluid retention, pelvic pain, or dyspareunia. No vaginal discharge  Post-menopausal bleeding: None  Urinary incontinence: None  Folate intake: NA     Health Maintenance  Advanced directive: Counseled   Osteoporosis Screen/ DEXA: NA   PT/vit D for falls prevention: Counseled   Cholesterol Screening: Ordered   Diabetes Screening: Ordered   Aspirin Use: NA      Anticipatory Guidance  Diet: Homecooked meals, good protein intake   Exercise: Gardening, active with kids, skiing, going to start swimming   Substance Abuse: None   Safe in relationship.   Seat belts, bike helmet, gun safety discussed.  Sun protection used.    Cancer screening  Colorectal Cancer Screening: [place] in     Lung Cancer Screening: NA    Cervical Cancer Screening: Completed   Breast Cancer Screening: UTD     Infectious disease screening/Immunizations  --STI Screening: Declines   --Practices safe sex.  --HIV Screening: Ordered   --Hepatitis C Screening: Ordered   --Immunizations:    Influenza: Ordered   HPV:  Na    Tetanus: UTD    Shingles: Recommended   Pneumococcal : NA                Other immunizations: NA     She  has a past medical history of Familial tremor (2017), Lyme disease, and Status post device closure of ASD.  She  has a past surgical history that includes other cardiac surgery.    Family History   Problem Relation Age of Onset    Heart Attack Mother     Cancer Mother 50        breast     Diabetes Mother     Depression Mother        Social History     Socioeconomic History    Marital status:      Spouse name: Not on file    Number of children: Not on file    Years of education: Not on file    Highest education level: Bachelor's degree (e.g., BA, AB, BS)   Occupational History    Not on file   Tobacco Use    Smoking status: Former    Smokeless tobacco: Never    Tobacco comments:     social smoker in the 90s   Vaping Use    Vaping Use: Never used   Substance and Sexual Activity    Alcohol use: Yes     Alcohol/week: 2.4 oz     Types: 4 Glasses of wine per week    Drug use: No    Sexual activity: Yes     Partners: Male   Other Topics Concern    Not on file   Social History Narrative    Not on file     Social Determinants of Health     Financial Resource Strain: Low Risk  (10/11/2023)    Overall Financial Resource Strain (CARDIA)     Difficulty of Paying Living Expenses: Not hard at all   Food Insecurity: No Food Insecurity (10/11/2023)    Hunger Vital Sign     Worried About Running Out of Food in the Last Year: Never true     Ran Out of Food in the Last Year: Never true   Transportation Needs: No Transportation Needs (10/11/2023)    PRAPARE - Transportation     Lack of Transportation (Medical): No     Lack of Transportation (Non-Medical): No   Physical Activity: Insufficiently Active (10/11/2023)    Exercise Vital Sign     Days of Exercise per Week: 2 days     Minutes of Exercise per Session: 30 min   Stress: No Stress Concern Present (10/11/2023)    [employer] [place] [employer] - Occupational Stress Questionnaire     Feeling of Stress : Only a little   Social Connections: Moderately Integrated (10/11/2023)    Social Connection and Isolation Panel [NHANES]     Frequency of Communication with Friends and Family: Never     Frequency of Social Gatherings with Friends and Family: Once a week     Attends [employer] Services: 1 to 4 times per year     Active Member of Clubs or  Organizations: Yes     Attends Club or Organization Meetings: More than 4 times per year     Marital Status:    Intimate Partner Violence: Not on file   Housing Stability: Low Risk  (10/11/2023)    Housing Stability Vital Sign     Unable to Pay for Housing in the Last Year: No     Number of Places Lived in the Last Year: 1     Unstable Housing in the Last Year: No       Patient Active Problem List    Diagnosis Date Noted    Pure hypercholesterolemia 10/27/2022    Neutropenia (HCC) 10/27/2022    Dairy product intolerance 09/26/2022    Recurrent major depressive disorder, in partial remission (HCC) 09/26/2022    Vaginal itching 11/07/2018    Lyme disease 06/14/2017    Familial tremor 06/14/2017    Anxiety 06/14/2017         Current Outpatient Medications   Medication Sig Dispense Refill    sertraline (ZOLOFT) 50 MG Tab Take 1 Tablet by mouth every day. 90 Tablet 3    ondansetron (ZOFRAN ODT) 4 MG TABLET DISPERSIBLE Take 1 Tablet by mouth every 6 hours as needed for Nausea/Vomiting. 20 Tablet 1    hydrOXYzine HCl (ATARAX) 10 MG Tab Take 1 Tablet by mouth at bedtime as needed (sleep). 30 Tablet 0    estrogens, conjugated (PREMARIN) 0.625 MG/GM Cream Insert 0.5 g into the vagina every day. 30 g 2     No current facility-administered medications for this visit.     No Known Allergies    Review of Systems   Constitutional: Negative for fever, chills and malaise/fatigue.   HENT: Negative for congestion.    Eyes: Negative for pain.   Respiratory: Negative for cough and shortness of breath.    Cardiovascular: Negative for leg swelling.   Gastrointestinal: Negative for nausea, vomiting, abdominal pain and diarrhea.   Genitourinary: Negative for dysuria and hematuria.   Skin: Negative for rash.   Neurological: Negative for dizziness, focal weakness and headaches.   Endo/Heme/Allergies: Does not bruise/bleed easily.   Psychiatric/Behavioral: Negative for depression.  The patient is not nervous/anxious.      Objective:  "    BP 98/58   Pulse 67   Temp 36.5 °C (97.7 °F) (Temporal)   Resp 12   Ht 1.651 m (5' 5\")   Wt 52.2 kg (115 lb)   LMP 06/01/2017   SpO2 99%   BMI 19.14 kg/m²   Body mass index is 19.14 kg/m².  Wt Readings from Last 4 Encounters:   11/09/23 52.2 kg (115 lb)   05/03/23 49.9 kg (110 lb)   03/13/23 47.6 kg (105 lb)   02/02/23 49.3 kg (108 lb 9.6 oz)       Physical Exam:  Constitutional: Well-developed and well-nourished. Not diaphoretic. No distress.   Skin: Skin is warm and dry. No rash noted.  Head: Atraumatic without lesions.  Eyes: Conjunctivae and extraocular motions are normal. Pupils are equal, round, and reactive to light. No scleral icterus.   Ears:  External ears unremarkable. Tympanic membranes clear and intact.  Nose: Nares patent. Septum midline. Turbinates without erythema nor edema. No discharge.   Mouth/Throat: Dentition is intact. Tongue normal. Oropharynx is clear and moist. Posterior pharynx without erythema or exudates.  Neck: Supple, trachea midline. Normal range of motion. No thyromegaly present. No lymphadenopathy--cervical or supraclavicular.  Cardiovascular: Regular rate and rhythm, S1 and S2 without murmur, rubs, or gallops.  Lungs: Normal inspiratory effort, CTA bilaterally, no wheezes/rhonchi/rales  Breast: Declines  Abdomen: Soft, non tender, and without distention. Active bowel sounds in all four quadrants. No rebound, guarding, masses or HSM.  :Perineum and external genitalia normal without rash. Vagina with normal and physiologic discharge. Cervix without visible lesions or discharge. Bimanual exam without adnexal masses or cervical motion tenderness.  Extremities: No cyanosis, clubbing, erythema, nor edema. Distal pulses intact and symmetric.   Musculoskeletal: All major joints AROM full in all directions without pain.  Neurological: Alert and oriented x 3. DTRs 2+/3 and symmetric. No cranial nerve deficit. 5/5 myotomes. Sensation intact.   Psychiatric:  Behavior, mood, and " affect are appropriate.    A chaperone was offered to the patient during today's exam. Chaperone name: Amelia was present.    Assessment and Plan:     1. Wellness examination  CBC WITH DIFFERENTIAL    Comp Metabolic Panel    Lipid Profile    TSH WITH REFLEX TO FT4    Thinprep Pap with HPV      2. Need for hepatitis C screening test  HEP C VIRUS ANTIBODY      3. Screening for HIV (human immunodeficiency virus)  HIV AG/AB COMBO ASSAY SCREENING      4. Recurrent major depressive disorder, in partial remission (HCC)        5. Need for vaccination  Influenza Vaccine, High Dose (65+ Only)          HCM:  UTD   Labs per orders  Immunizations per orders  Patient counseled about skin care, diet, supplements, prenatal vitamins, safe sex and exercise.      Follow-up: Return in about 1 year (around 11/9/2024) for AWV.       Pyelonephritis

## 2025-01-05 ENCOUNTER — EMERGENCY (EMERGENCY)
Facility: HOSPITAL | Age: 55
LOS: 0 days | Discharge: HOME | End: 2025-01-05
Attending: EMERGENCY MEDICINE
Payer: COMMERCIAL

## 2025-01-05 VITALS
HEART RATE: 79 BPM | TEMPERATURE: 98 F | DIASTOLIC BLOOD PRESSURE: 90 MMHG | RESPIRATION RATE: 18 BRPM | SYSTOLIC BLOOD PRESSURE: 140 MMHG | OXYGEN SATURATION: 100 %

## 2025-01-05 VITALS — HEIGHT: 62 IN | WEIGHT: 179.9 LBS

## 2025-01-05 DIAGNOSIS — I10 ESSENTIAL (PRIMARY) HYPERTENSION: ICD-10-CM

## 2025-01-05 DIAGNOSIS — E11.9 TYPE 2 DIABETES MELLITUS WITHOUT COMPLICATIONS: ICD-10-CM

## 2025-01-05 DIAGNOSIS — E78.5 HYPERLIPIDEMIA, UNSPECIFIED: ICD-10-CM

## 2025-01-05 DIAGNOSIS — N64.4 MASTODYNIA: ICD-10-CM

## 2025-01-05 DIAGNOSIS — Z90.710 ACQUIRED ABSENCE OF BOTH CERVIX AND UTERUS: Chronic | ICD-10-CM

## 2025-01-05 DIAGNOSIS — R06.02 SHORTNESS OF BREATH: ICD-10-CM

## 2025-01-05 LAB
ALBUMIN SERPL ELPH-MCNC: 3.7 G/DL — SIGNIFICANT CHANGE UP (ref 3.3–5)
ALP SERPL-CCNC: 85 U/L — SIGNIFICANT CHANGE UP (ref 40–120)
ALT FLD-CCNC: 34 U/L — SIGNIFICANT CHANGE UP (ref 12–78)
ANION GAP SERPL CALC-SCNC: 3 MMOL/L — LOW (ref 5–17)
APTT BLD: 33.9 SEC — SIGNIFICANT CHANGE UP (ref 24.5–35.6)
AST SERPL-CCNC: 29 U/L — SIGNIFICANT CHANGE UP (ref 15–37)
BASOPHILS # BLD AUTO: 0.03 K/UL — SIGNIFICANT CHANGE UP (ref 0–0.2)
BASOPHILS NFR BLD AUTO: 0.4 % — SIGNIFICANT CHANGE UP (ref 0–2)
BILIRUB SERPL-MCNC: 0.4 MG/DL — SIGNIFICANT CHANGE UP (ref 0.2–1.2)
BUN SERPL-MCNC: 9 MG/DL — SIGNIFICANT CHANGE UP (ref 7–23)
CALCIUM SERPL-MCNC: 8.8 MG/DL — SIGNIFICANT CHANGE UP (ref 8.5–10.1)
CHLORIDE SERPL-SCNC: 105 MMOL/L — SIGNIFICANT CHANGE UP (ref 96–108)
CO2 SERPL-SCNC: 28 MMOL/L — SIGNIFICANT CHANGE UP (ref 22–31)
CREAT SERPL-MCNC: 0.68 MG/DL — SIGNIFICANT CHANGE UP (ref 0.5–1.3)
D DIMER BLD IA.RAPID-MCNC: <150 NG/ML DDU — SIGNIFICANT CHANGE UP
EGFR: 104 ML/MIN/1.73M2 — SIGNIFICANT CHANGE UP
EOSINOPHIL # BLD AUTO: 0.35 K/UL — SIGNIFICANT CHANGE UP (ref 0–0.5)
EOSINOPHIL NFR BLD AUTO: 4.3 % — SIGNIFICANT CHANGE UP (ref 0–6)
GLUCOSE SERPL-MCNC: 117 MG/DL — HIGH (ref 70–99)
HCG SERPL-ACNC: 2 MIU/ML — SIGNIFICANT CHANGE UP
HCT VFR BLD CALC: 41.4 % — SIGNIFICANT CHANGE UP (ref 34.5–45)
HGB BLD-MCNC: 13.5 G/DL — SIGNIFICANT CHANGE UP (ref 11.5–15.5)
IMM GRANULOCYTES NFR BLD AUTO: 0.4 % — SIGNIFICANT CHANGE UP (ref 0–0.9)
INR BLD: 1 RATIO — SIGNIFICANT CHANGE UP (ref 0.85–1.16)
LYMPHOCYTES # BLD AUTO: 3.19 K/UL — SIGNIFICANT CHANGE UP (ref 1–3.3)
LYMPHOCYTES # BLD AUTO: 39.6 % — SIGNIFICANT CHANGE UP (ref 13–44)
MCHC RBC-ENTMCNC: 29.2 PG — SIGNIFICANT CHANGE UP (ref 27–34)
MCHC RBC-ENTMCNC: 32.6 G/DL — SIGNIFICANT CHANGE UP (ref 32–36)
MCV RBC AUTO: 89.6 FL — SIGNIFICANT CHANGE UP (ref 80–100)
MONOCYTES # BLD AUTO: 0.55 K/UL — SIGNIFICANT CHANGE UP (ref 0–0.9)
MONOCYTES NFR BLD AUTO: 6.8 % — SIGNIFICANT CHANGE UP (ref 2–14)
NEUTROPHILS # BLD AUTO: 3.9 K/UL — SIGNIFICANT CHANGE UP (ref 1.8–7.4)
NEUTROPHILS NFR BLD AUTO: 48.5 % — SIGNIFICANT CHANGE UP (ref 43–77)
PLATELET # BLD AUTO: 261 K/UL — SIGNIFICANT CHANGE UP (ref 150–400)
POTASSIUM SERPL-MCNC: 4 MMOL/L — SIGNIFICANT CHANGE UP (ref 3.5–5.3)
POTASSIUM SERPL-SCNC: 4 MMOL/L — SIGNIFICANT CHANGE UP (ref 3.5–5.3)
PROT SERPL-MCNC: 7.9 GM/DL — SIGNIFICANT CHANGE UP (ref 6–8.3)
PROTHROM AB SERPL-ACNC: 11.5 SEC — SIGNIFICANT CHANGE UP (ref 9.9–13.4)
RBC # BLD: 4.62 M/UL — SIGNIFICANT CHANGE UP (ref 3.8–5.2)
RBC # FLD: 13.2 % — SIGNIFICANT CHANGE UP (ref 10.3–14.5)
SODIUM SERPL-SCNC: 136 MMOL/L — SIGNIFICANT CHANGE UP (ref 135–145)
TROPONIN I, HIGH SENSITIVITY RESULT: 4.12 NG/L — SIGNIFICANT CHANGE UP
WBC # BLD: 8.05 K/UL — SIGNIFICANT CHANGE UP (ref 3.8–10.5)
WBC # FLD AUTO: 8.05 K/UL — SIGNIFICANT CHANGE UP (ref 3.8–10.5)

## 2025-01-05 PROCEDURE — 85025 COMPLETE CBC W/AUTO DIFF WBC: CPT

## 2025-01-05 PROCEDURE — 93005 ELECTROCARDIOGRAM TRACING: CPT

## 2025-01-05 PROCEDURE — 85610 PROTHROMBIN TIME: CPT

## 2025-01-05 PROCEDURE — 80053 COMPREHEN METABOLIC PANEL: CPT

## 2025-01-05 PROCEDURE — 84702 CHORIONIC GONADOTROPIN TEST: CPT

## 2025-01-05 PROCEDURE — 99285 EMERGENCY DEPT VISIT HI MDM: CPT

## 2025-01-05 PROCEDURE — 71045 X-RAY EXAM CHEST 1 VIEW: CPT

## 2025-01-05 PROCEDURE — 93010 ELECTROCARDIOGRAM REPORT: CPT

## 2025-01-05 PROCEDURE — 85379 FIBRIN DEGRADATION QUANT: CPT

## 2025-01-05 PROCEDURE — 36415 COLL VENOUS BLD VENIPUNCTURE: CPT

## 2025-01-05 PROCEDURE — 96374 THER/PROPH/DIAG INJ IV PUSH: CPT

## 2025-01-05 PROCEDURE — 85730 THROMBOPLASTIN TIME PARTIAL: CPT

## 2025-01-05 PROCEDURE — 71045 X-RAY EXAM CHEST 1 VIEW: CPT | Mod: 26

## 2025-01-05 PROCEDURE — 84484 ASSAY OF TROPONIN QUANT: CPT

## 2025-01-05 PROCEDURE — 99285 EMERGENCY DEPT VISIT HI MDM: CPT | Mod: 25

## 2025-01-05 RX ORDER — KETOROLAC TROMETHAMINE 30 MG/ML
15 INJECTION INTRAMUSCULAR; INTRAVENOUS ONCE
Refills: 0 | Status: DISCONTINUED | OUTPATIENT
Start: 2025-01-05 | End: 2025-01-05

## 2025-01-05 RX ADMIN — KETOROLAC TROMETHAMINE 15 MILLIGRAM(S): 30 INJECTION INTRAMUSCULAR; INTRAVENOUS at 13:04

## 2025-01-05 NOTE — ED ADULT NURSE NOTE - NSFALLUNIVINTERV_ED_ALL_ED
Bed/Stretcher in lowest position, wheels locked, appropriate side rails in place/Call bell, personal items and telephone in reach/Instruct patient to call for assistance before getting out of bed/chair/stretcher/Non-slip footwear applied when patient is off stretcher/[place] to call system/Physically safe environment - no spills, clutter or unnecessary equipment/Purposeful proactive rounding/Room/bathroom lighting operational, light cord in reach

## 2025-01-05 NOTE — ED STATDOCS - PROGRESS NOTE DETAILS
54-year-old female with past medical history of hypertension hyperlipidemia diabetes presents to the ED complaining of a constant chest pain/breast pain and shortness of breath for 1 week.  Patient reports the pain is worse when laying flat no associated fevers chills or coughing.  Son who is interpreting for patient reports she might of had a cold a few weeks prior to onset.  In the ED white blood cell count is 8 normal hemoglobin hematocrit D-dimer is less than 150 electrolytes are normal BUN is 9 creatinine is 0.68 LFTs are not elevated troponin was 4.  Chest x-ray showed clear lungs that was similar when compared to chest x-ray from 2019.  On reeval patient reports pain has resolved after medication in the ED.  Patient is in no apparent distress.  Informed patient there is a possibility of inflammation in chest status post URI.  Patient should continue NSAIDs every 6 hours with food.  Patient can follow-up with her primary care doctor and cardiologist.  Patient reports she saw her cardiologist approximately 1 month ago so she can make a follow-up appointment.  Will discharge home.  Sofia Aguilera PA-C

## 2025-01-05 NOTE — ED STATDOCS - NSFOLLOWUPINSTRUCTIONS_ED_ALL_ED_FT
Can continue Ibuprofen every 6 hours with food for pain.      Follow up with your Primary Care doctor.      Placed for Cardiology referral for further outpatient evaluation.            [place]anese    Dolor de pecho no específico en los adultos  Nonspecific Chest Pain, Adult  El dolor de pecho es paula sensación molesta, opresiva o dolorosa en el pecho. El dolor se siente nabor paula aplastamiento, torsión u opresión en el pecho. Paula persona puede sentir paula sensación de ardor u hormigueo. El dolor de pecho también se puede sentir en la espalda, el mookie, la mandíbula, el hombro o el brazo. Linda dolor puede empeorar al moverse, estornudar o respirar profundamente.    El dolor de pecho puede deberse a paula afección potencialmente mortal. Se debe tratar de inmediato. También puede ser provocado por algo que no es potencialmente mortal. Si tiene dolor de pecho, puede ser difícil saber la diferencia, por lo tanto es importante que obtenga ayuda de inmediato para asegurarse de que no tiene paula afección grave.    Algunas causas potencialmente mortales del dolor de pecho incluyen:  Infarto de miocardio.  Un desgarro en el vaso sanguíneo principal del cuerpo (disección aórtica).  Inflamación alrededor del corazón (pericarditis).  Un problema en los pulmones, nabor un coágulo de noemi (embolia pulmonar) o un pulmón colapsado (neumotórax).  Algunas causas de dolor de pecho que no son potencialmente mortales incluyen:  Acidez estomacal.  Ansiedad o estrés.  Daño de los huesos, los músculos y los cartílagos que conforman la pared torácica.  Neumonía o bronquitis.  Culebrilla (virus de la varicela zóster).  El dolor de pecho puede aparecer y desaparecer. También puede ser britney. Jones médico le hará pruebas clínicas y otros estudios para tratar de determinar la causa del dolor. El tratamiento dependerá de la causa del dolor de pecho.    Siga estas instrucciones en jones casa:  Medicamentos    Use los medicamentos de venta kristen y los recetados solamente nabor se lo haya indicado el médico.  Si le recetaron un antibiótico, tómelo nabor se lo haya indicado el médico. No deje de joaquin el antibiótico aunque comience a sentirse mejor.  Actividad    Evite las actividades que le causen dolor de pecho.  No levante ningún objeto que pese más de 10 libras (4,5 kg) o que supere el límite de peso que le hayan indicado, hasta que el médico le diga que puede hacerlo.  [place] reposo nabor se lo haya indicado el médico.  Retome consuelo actividades normales solo nabor se lo haya indicado el médico. Pregúntele al médico qué actividades son seguras para usted.  Estilo de agustin    A plate along with examples of foods in a healthy diet.  Silhouette of a person sitting on the floor doing yoga.  No consuma ningún producto que contenga nicotina o tabaco, nabor cigarrillos, cigarrillos electrónicos y tabaco de mascar. Si necesita ayuda para dejar de fumar, consulte al médico.  No main alcohol.  Opte por un estilo de agustin saludable nabor se lo hayan recomendado. Puede incluir:  Practicar actividad física con regularidad. [place] al médico que le sugiera ejercicios que lauren seguros para usted.  Seguir paula dieta cardiosaludable. Esta debe incluir muchas frutas y verduras frescas, cereales integrales, proteínas (magras) con bajo contenido de grasa y productos lácteos bajos en grasa. Un nutricionista podrá ayudarlo a hacer elecciones de alimentación saludables.  Mantener un peso saludable.  Controlar cualquier otra afección que tenga, nabor presión arterial hemalatha (hipertensión) o diabetes.  Disminuir el nivel de estrés; por ejemplo, con yoga o técnicas de relajación.  Instrucciones generales    Esté atento a cualquier cambio en los síntomas.  Es jones responsabilidad obtener los resultados de cualquier prueba que se haya realizado. Consulte al médico o pregunte en el departamento donde se realizan las pruebas cuándo estarán listos los resultados.  Concurra a todas las visitas de seguimiento nabor se lo haya indicado el médico. [place] es importante.  Es posible que le pidan que se someta a más pruebas si el dolor de pecho no desaparece.  Comuníquese con un médico si:  El dolor de pecho no desaparece.  Se siente deprimido.  Tiene fiebre.  Nota cambios en los síntomas o desarrolla síntomas nuevos.  Solicite ayuda de inmediato si:  El dolor en el pecho empeora.  Tiene tos que empeora o tose con noemi.  Siente un dolor intenso en el abdomen.  Se desmaya.  Tiene paula molestia repentina e inexplicable en el pecho.  Tiene molestias repentinas e inexplicables en los brazos, la espalda, el mookie o la mandíbula.  Le falta el aire en cualquier momento.  Comienza a sudar de manera repentina o la piel se le humedece.  Siente náuseas o vomita.  Se siente repentinamente mareado o se desmaya.  Tiene debilidad intensa, o debilidad o fatiga sin explicación.  Siente que el corazón comienza a latir rápidamente o que se saltea latidos.  Estos síntomas pueden representar un problema grave que constituye paula emergencia. No espere a suri si los síntomas desaparecen. Solicite atención médica de inmediato. Comuníquese con el servicio de emergencias de jones localidad (911 en los [place]). No conduzca por consuelo propios medios hasta el hospital.    Resumen  El dolor de pecho puede ser provocado por paula afección que es grave y requiere tratamiento urgente. También puede ser provocado por algo que no es potencialmente mortal.  El médico puede hacerle pruebas clínicas y otros estudios para tratar de determinar la causa del dolor.  Siga las instrucciones de jones médico sobre joaquin medicamentos, hacer cambios en jones estilo de agustin y recibir tratamiento de urgencia si los síntomas empeoran.  Concurra a todas las visitas de seguimiento nabor se lo haya indicado el médico. [place]anna incluye las visitas para realizarle otras pruebas si el dolor de pecho no desaparece.  Esta información no tiene nabor fin reemplazar el consejo del médico. Asegúrese de hacerle al médico cualquier pregunta que tenga.    Document Revised: 03/23/2022 Document Reviewed: 11/02/2023  [employer] Patient Education © 2024 [employer]  [employer] logo  Terms and Conditions  Privacy Policy  Editorial Policy  All content on this site: Copyright © 2025 Elsevier, its licensors, and contributors. All rights are reserved, including those for text and data mining, AI training, and similar technologies. For all open access content, the Creative Commons licensing terms apply.  Cookies are used by this site. To decline or learn more, visit our Cookies page.

## 2025-01-05 NOTE — ED STATDOCS - CLINICAL SUMMARY MEDICAL DECISION MAKING FREE TEXT BOX
53 y/o F with hx of HTN, HLD presents for CP for several days likely over a week as she saw PMD for breast pain and had a breast sonogram done. Patient still with continued pain daily constant somewhat worse with lying flat. No pain meds. Comfortable at this time. EKG with no acute ST changes. Plan labs including troponin, d-dimer; chest x-ray and reassess. If unremarkable, pt to f/u with PCP for further management and results of breast sonogram.

## 2025-01-05 NOTE — ED STATDOCS - OBJECTIVE STATEMENT
54 year old female with PMHx of HTN, HLD, DM, pyelonephritis; presents to ED c/o constant chest/breast pain and SOB x 1 week, worsening over the past 4 days. Pain is worse when laying flat. Pt saw her PCP x 1 week ago, had a breast sonogram done because her doctor felt something, results are pending

## 2025-01-05 NOTE — ED ADULT TRIAGE NOTE - CHIEF COMPLAINT QUOTE
Pt presents to ED c/o chest pain x4days. pt saw PMD and has a sonogram of heart that PMD ordered but unsure of results. pain worsened last two nights and reports chest pressure when laying. denies SOB, fever. Hx DM and hyperlipidemia. EKG in progress

## 2025-01-05 NOTE — ED STATDOCS - ATTENDING APP SHARED VISIT CONTRIBUTION OF CARE
I,Milind Madsen MD,  performed the initial face to face bedside interview with this patient regarding history of present illness, review of symptoms and relevant past medical, social and family history.  I completed an independent physical examination.  I was the initial provider who evaluated this patient. I have signed out the follow up of any pending tests (i.e. labs, radiological studies) to the ACP.  I have communicated the patient’s plan of care and disposition with the ACP.  The history, relevant review of systems, past medical and surgical history, medical decision making, and physical examination was documented by the scribe in my presence and I attest to the accuracy of the documentation.

## 2025-01-05 NOTE — ED STATDOCS - PATIENT PORTAL LINK FT
You can access the FollowMyHealth Patient Portal offered by [facility] by registering at the following website: [email or web address]. By joining [employer]’s FollowMyHealth portal, you will also be able to view your health information using other applications (apps) compatible with our system.

## 2025-01-05 NOTE — ED ADULT NURSE NOTE - OBJECTIVE STATEMENT
presents to ed with chest pain beginning 1 week ago  but worsening today and yesterday. denies shortness of breath, fevers, recent travel

## 2025-05-07 ENCOUNTER — EMERGENCY (EMERGENCY)
Facility: HOSPITAL | Age: 55
LOS: 0 days | Discharge: HOME | End: 2025-05-08
Attending: STUDENT IN AN ORGANIZED HEALTH CARE EDUCATION/TRAINING PROGRAM
Payer: COMMERCIAL

## 2025-05-07 VITALS
DIASTOLIC BLOOD PRESSURE: 83 MMHG | OXYGEN SATURATION: 99 % | WEIGHT: 183.2 LBS | TEMPERATURE: 99 F | HEART RATE: 76 BPM | RESPIRATION RATE: 16 BRPM | SYSTOLIC BLOOD PRESSURE: 131 MMHG

## 2025-05-07 DIAGNOSIS — E11.9 TYPE 2 DIABETES MELLITUS WITHOUT COMPLICATIONS: ICD-10-CM

## 2025-05-07 DIAGNOSIS — I10 ESSENTIAL (PRIMARY) HYPERTENSION: ICD-10-CM

## 2025-05-07 DIAGNOSIS — Z90.710 ACQUIRED ABSENCE OF BOTH CERVIX AND UTERUS: Chronic | ICD-10-CM

## 2025-05-07 DIAGNOSIS — M62.830 MUSCLE SPASM OF BACK: ICD-10-CM

## 2025-05-07 DIAGNOSIS — M25.511 PAIN IN RIGHT SHOULDER: ICD-10-CM

## 2025-05-07 LAB
ALBUMIN SERPL ELPH-MCNC: 3.8 G/DL — SIGNIFICANT CHANGE UP (ref 3.3–5)
ALP SERPL-CCNC: 93 U/L — SIGNIFICANT CHANGE UP (ref 40–120)
ALT FLD-CCNC: 38 U/L — SIGNIFICANT CHANGE UP (ref 12–78)
ANION GAP SERPL CALC-SCNC: 5 MMOL/L — SIGNIFICANT CHANGE UP (ref 5–17)
AST SERPL-CCNC: 26 U/L — SIGNIFICANT CHANGE UP (ref 15–37)
BILIRUB SERPL-MCNC: 0.4 MG/DL — SIGNIFICANT CHANGE UP (ref 0.2–1.2)
BUN SERPL-MCNC: 8 MG/DL — SIGNIFICANT CHANGE UP (ref 7–23)
CALCIUM SERPL-MCNC: 8.7 MG/DL — SIGNIFICANT CHANGE UP (ref 8.5–10.1)
CHLORIDE SERPL-SCNC: 105 MMOL/L — SIGNIFICANT CHANGE UP (ref 96–108)
CO2 SERPL-SCNC: 28 MMOL/L — SIGNIFICANT CHANGE UP (ref 22–31)
CREAT SERPL-MCNC: 0.75 MG/DL — SIGNIFICANT CHANGE UP (ref 0.5–1.3)
EGFR: 95 ML/MIN/1.73M2 — SIGNIFICANT CHANGE UP
EGFR: 95 ML/MIN/1.73M2 — SIGNIFICANT CHANGE UP
GLUCOSE SERPL-MCNC: 133 MG/DL — HIGH (ref 70–99)
HCT VFR BLD CALC: 40.1 % — SIGNIFICANT CHANGE UP (ref 34.5–45)
HGB BLD-MCNC: 13.1 G/DL — SIGNIFICANT CHANGE UP (ref 11.5–15.5)
MCHC RBC-ENTMCNC: 29.1 PG — SIGNIFICANT CHANGE UP (ref 27–34)
MCHC RBC-ENTMCNC: 32.7 G/DL — SIGNIFICANT CHANGE UP (ref 32–36)
MCV RBC AUTO: 89.1 FL — SIGNIFICANT CHANGE UP (ref 80–100)
NRBC # BLD AUTO: 0 K/UL — SIGNIFICANT CHANGE UP (ref 0–0)
NRBC # FLD: 0 K/UL — SIGNIFICANT CHANGE UP (ref 0–0)
NRBC BLD AUTO-RTO: 0 /100 WBCS — SIGNIFICANT CHANGE UP (ref 0–0)
PLATELET # BLD AUTO: 279 K/UL — SIGNIFICANT CHANGE UP (ref 150–400)
PMV BLD: 9.8 FL — SIGNIFICANT CHANGE UP (ref 7–13)
POTASSIUM SERPL-MCNC: 4 MMOL/L — SIGNIFICANT CHANGE UP (ref 3.5–5.3)
POTASSIUM SERPL-SCNC: 4 MMOL/L — SIGNIFICANT CHANGE UP (ref 3.5–5.3)
PROT SERPL-MCNC: 8.2 GM/DL — SIGNIFICANT CHANGE UP (ref 6–8.3)
RBC # BLD: 4.5 M/UL — SIGNIFICANT CHANGE UP (ref 3.8–5.2)
RBC # FLD: 13.1 % — SIGNIFICANT CHANGE UP (ref 10.3–14.5)
SODIUM SERPL-SCNC: 138 MMOL/L — SIGNIFICANT CHANGE UP (ref 135–145)
TROPONIN I, HIGH SENSITIVITY RESULT: 3.01 NG/L — SIGNIFICANT CHANGE UP
WBC # BLD: 10.94 K/UL — HIGH (ref 3.8–10.5)
WBC # FLD AUTO: 10.94 K/UL — HIGH (ref 3.8–10.5)

## 2025-05-07 PROCEDURE — 84484 ASSAY OF TROPONIN QUANT: CPT

## 2025-05-07 PROCEDURE — 99284 EMERGENCY DEPT VISIT MOD MDM: CPT

## 2025-05-07 PROCEDURE — 93010 ELECTROCARDIOGRAM REPORT: CPT

## 2025-05-07 PROCEDURE — 36415 COLL VENOUS BLD VENIPUNCTURE: CPT

## 2025-05-07 PROCEDURE — 85027 COMPLETE CBC AUTOMATED: CPT

## 2025-05-07 PROCEDURE — 93005 ELECTROCARDIOGRAM TRACING: CPT

## 2025-05-07 PROCEDURE — 80053 COMPREHEN METABOLIC PANEL: CPT

## 2025-05-07 PROCEDURE — 71046 X-RAY EXAM CHEST 2 VIEWS: CPT

## 2025-05-07 PROCEDURE — 99285 EMERGENCY DEPT VISIT HI MDM: CPT | Mod: 25

## 2025-05-07 PROCEDURE — 73030 X-RAY EXAM OF SHOULDER: CPT | Mod: 26,RT

## 2025-05-07 PROCEDURE — 73030 X-RAY EXAM OF SHOULDER: CPT | Mod: RT

## 2025-05-07 PROCEDURE — 96374 THER/PROPH/DIAG INJ IV PUSH: CPT

## 2025-05-07 PROCEDURE — 71046 X-RAY EXAM CHEST 2 VIEWS: CPT | Mod: 26

## 2025-05-07 RX ORDER — LIDOCAINE HYDROCHLORIDE 20 MG/ML
1 JELLY TOPICAL ONCE
Refills: 0 | Status: COMPLETED | OUTPATIENT
Start: 2025-05-07 | End: 2025-05-07

## 2025-05-07 RX ORDER — KETOROLAC TROMETHAMINE 30 MG/ML
30 INJECTION, SOLUTION INTRAMUSCULAR; INTRAVENOUS ONCE
Refills: 0 | Status: DISCONTINUED | OUTPATIENT
Start: 2025-05-07 | End: 2025-05-07

## 2025-05-07 RX ORDER — CYCLOBENZAPRINE HYDROCHLORIDE 15 MG/1
10 CAPSULE, EXTENDED RELEASE ORAL ONCE
Refills: 0 | Status: COMPLETED | OUTPATIENT
Start: 2025-05-07 | End: 2025-05-07

## 2025-05-07 RX ORDER — CYCLOBENZAPRINE HYDROCHLORIDE 15 MG/1
1 CAPSULE, EXTENDED RELEASE ORAL
Qty: 30 | Refills: 0
Start: 2025-05-07

## 2025-05-07 RX ADMIN — KETOROLAC TROMETHAMINE 30 MILLIGRAM(S): 30 INJECTION, SOLUTION INTRAMUSCULAR; INTRAVENOUS at 22:29

## 2025-05-07 RX ADMIN — CYCLOBENZAPRINE HYDROCHLORIDE 10 MILLIGRAM(S): 15 CAPSULE, EXTENDED RELEASE ORAL at 22:30

## 2025-05-07 RX ADMIN — LIDOCAINE HYDROCHLORIDE 1 PATCH: 20 JELLY TOPICAL at 22:30

## 2025-05-07 NOTE — ED STATDOCS - NSFOLLOWUPINSTRUCTIONS_ED_ALL_ED_FT
Muscle Spasm    WHAT YOU NEED TO KNOW:    A muscle spasm is a sudden contraction of any muscle or group of muscles. A muscle cramp is a painful muscle spasm. Muscle cramps commonly occur after intense exercise or during pregnancy. They may also be caused by certain medications, dehydration, low calcium or magnesium levels, or another medical condition.    DISCHARGE INSTRUCTIONS:    Medicines: You may need the following:    NSAIDs help decrease swelling and pain or fever. This medicine is available with or without a doctor's order. NSAIDs can cause stomach bleeding or kidney problems in certain people. If you take blood thinner medicine, always ask your healthcare provider if NSAIDs are safe for you. Always read the medicine label and follow directions.    Take your medicine as directed. Contact your healthcare provider if you think your medicine is not helping or if you have side effects. Tell your provider if you are allergic to any medicine. Keep a list of the medicines, vitamins, and herbs you take. Include the amounts, and when and why you take them. Bring the list or the pill bottles to follow-up visits. Carry your medicine list with you in case of an emergency.  Follow up with your healthcare provider as directed: You may need other tests or treatment. You may also be referred to a physical therapist or other specialist. Write down your questions so you remember to ask them during your visits.    Self-care:    Stretch your muscle to help relieve the cramp. It may be helpful to keep your muscle in the stretched position until the cramp is gone.    Apply heat to help decrease pain and muscle spasms. Apply heat on the area for 20 to 30 minutes every 2 hours for as many days as directed.    Apply ice to help decrease swelling and pain. Ice may also help prevent tissue damage. Use an ice pack, or put crushed ice in a plastic bag. Cover it with a towel and place it on your muscle for 15 to 20 minutes every hour or as directed.    Drink more liquids to help prevent muscle cramps caused by dehydration. Sports drinks may help replace electrolytes you lose through sweat during exercise. Ask your healthcare provider how much liquid to drink each day and which liquids are best for you.    Eat healthy foods, such as fruits, vegetables, whole grains, low-fat dairy products, and lean proteins (meat, beans, and fish). If you are pregnant, ask your healthcare provider about foods that are high in magnesium and sodium. They may help to relieve cramps during pregnancy.    Massage your muscle to help relieve the cramp.    Take frequent deep breaths until the cramp feels better. Lie down while you take the deep breaths so you do not get dizzy or lightheaded.  Contact your healthcare provider if:    You have signs of dehydration, such as a headache, dark yellow urine, dry eyes or mouth, or a fast heartbeat.    You have questions or concerns about your condition or care.  Return to the emergency department if:    You have warmth, swelling, or redness in the cramping muscle.    You have frequent or unrelieved muscle cramps in several different muscles.    You have muscle cramps with numbness, tingling, and burning in your hands and feet.      Shoulder Sprain    WHAT YOU NEED TO KNOW:    What is a shoulder sprain? A shoulder sprain happens when a ligament in your shoulder is stretched or torn. Ligaments are the tough tissues that connect bones. Ligaments allow you to lift, lower, and rotate your arm.  Shoulder Anatomy    What are the signs and symptoms of a shoulder sprain?    Bruising or changes in skin color    Pain and stiffness, especially with movement    Swelling and tenderness  How is a shoulder sprain diagnosed? Your healthcare provider will ask you about your injury and examine you. Tell him or her if you heard a snap or pop when you were injured. Your healthcare provider will check the movement and strength of your joint. You may be asked to move the joint yourself. You may also need any of the following:    An x-ray, CT scan, or MRI may show the sprain or other damage. You may be given contrast liquid to help your injury show up better in the pictures. Tell the healthcare provider if you have ever had an allergic reaction to contrast liquid. Do not enter the MRI room with anything metal. Metal can cause serious injury. Tell the healthcare provider if you have any metal in or on your body.    Arthroscopy is a procedure to look inside your joint. Your healthcare provider makes a small incision in your joint and inserts a scope through it. The scope is a long tube with a magnifying glass, a camera, and a light on the end.  How is a shoulder sprain treated? Treatment depends on how severe your injury is and when the injury occurred. You may need any of the following:    A sling may be needed. A sling will limit movement of your arm and protect your shoulder joint.  Shoulder Sling      Acetaminophen decreases pain and fever. It is available without a doctor's order. Ask how much to take and how often to take it. Follow directions. Read the labels of all other medicines you are using to see if they also contain acetaminophen, or ask your doctor or pharmacist. Acetaminophen can cause liver damage if not taken correctly.    NSAIDs, such as ibuprofen, help decrease swelling, pain, and fever. This medicine is available with or without a doctor's order. NSAIDs can cause stomach bleeding or kidney problems in certain people. If you take blood thinner medicine, always ask your healthcare provider if NSAIDs are safe for you. Always read the medicine label and follow directions.    Prescription pain medicine may be given. Ask your healthcare provider how to take this medicine safely. Some prescription pain medicines contain acetaminophen. Do not take other medicines that contain acetaminophen without talking to your healthcare provider. Too much acetaminophen may cause liver damage. Prescription pain medicine may cause constipation. Ask your healthcare provider how to prevent or treat constipation.    Physical therapy may be recommended by your healthcare provider. A physical therapist teaches you exercises to help improve movement and strength, and to decrease pain.    Surgery may be needed to repair or replace a torn ligament if your sprain does not heal with other treatments.  How can I manage a shoulder sprain?    Rest your shoulder so it can heal. Avoid moving your shoulder as your injury heals. This will help decrease the risk of more damage to your shoulder.    Apply ice on your shoulder for 20 to 30 minutes every 2 hours or as directed. Use an ice pack, or put crushed ice in a plastic bag. Cover it with a towel before you apply it to your shoulder. Ice helps prevent tissue damage and decreases swelling and pain.    Compress your shoulder as directed. Compression provides support and helps decrease swelling and movement so your shoulder can heal.  How can I help prevent a shoulder sprain?    Do not exercise when you are tired or in pain. Warm up and stretch before you exercise.    Wear equipment to protect yourself when you play sports.    Wear shoes that fit well and run on flat surfaces to prevent falls.  When should I seek immediate care?    You feel severe pain in your shoulder when you move it, or it is touched.    Your skin feels cold or clammy.    You have numbness, tingling, or a feeling of pins and needles in your shoulder.    The skin on your injured shoulder looks blue or pale.  When should I call my doctor?    You have new or increased swelling and pain in your shoulder.    You have new or increased stiffness when you move your injured shoulder.    Your symptoms do not improve within 5 to 7 days.    You have questions or concerns about your condition or care.  CARE AGREEMENT:    You have the right to help plan your care. Learn about your health condition and how it may be treated. Discuss treatment options with your healthcare providers to decide what care you want to receive. You always have the right to refuse treatment.

## 2025-05-07 NOTE — ED STATDOCS - CARE PROVIDER_API CALL
Rohith Lal  Orthopaedic Surgery  [address] [place] [address]  Phone: [phone]  Fax: [phone]  Follow Up Time: Urgent

## 2025-05-07 NOTE — ED STATDOCS - PATIENT PORTAL LINK FT
You can access the FollowMyHealth Patient Portal offered by [facility] by registering at the following website: [email or web address]. By joining [facility]’s FollowMyHealth portal, you will also be able to view your health information using other applications (apps) compatible with our system.

## 2025-05-07 NOTE — ED STATDOCS - PROGRESS NOTE DETAILS
PA: Patient is a 54 y/o female with PMHx of DM, HTN, pyelonephritis, hysterectomy who presents to ED c/o R neck pain, R shoulder pain radiating down her arm x3 days. Moving her arm exacerbates her pain. ~Derek Mckeon PA-C PA note: All labwork/radiology results discussed in detail with patient. Patient re-examined and re-evaluated. Patient feels much better at this time. ED evaluation, Diagnosis and management discussed with the patient in detail. Workup results discussed with ED attending, OK to [place] home. Close ORTHO spine MD follow up encouraged, aftercare to assist with scheduling appointment ASAP. Strict ED return instructions discussed in detail and patient given the opportunity to ask any questions about their discharge diagnosis and instructions. Patient verbalized understanding. ~ Derek Mckeon PA-C

## 2025-05-07 NOTE — ED STATDOCS - PHYSICAL EXAMINATION
Constitutional: NAD, alert, verbal  HEENT: NCAT, EOMi, PERRL  Cardiac: RRR no MRG  Resp: clear, no wheezing or crackles  GI: ab soft ntnd, no r/g  MSK/Ext: no edema, pinpoint tenderness to R trapezius, pain with flexion of shoulder   Neuro: CATES  Skin: No rashes Constitutional: NAD, alert, verbal  HEENT: NCAT, EOMi, PERRL  Cardiac: RRR no MRG  Resp: clear, no wheezing or crackles  GI: ab soft ntnd, no r/g  MSK/Ext: no edema, pinpoint tenderness to R trapezius, pain with flexion of shoulder   Neuro: CATES  Skin: No rashes    PA NOTE: GEN: AOX3, NAD. HEENT: Throat clear. Airway is patent. EYES: PERRLA. EOMI. Head: NC/AT. NECK: Supple, No JVD. FROM. C-spine non-tender. CV:S1S2, RRR, LUNGS: Non-labored breathing, no tachypnea. O2sat 100% RA. CTA b/l. No w/r/r. CHEST: Equal chest expansion and rise. No deformity. ABD: Soft, NT/ND, no rebound, no guarding. No CVAT. EXT: No e/c/c. 2+ distal pulses. RUE: +MSK tenderness right trapezius muscle area. Mild pain with FROM right shoulder. SKIN: No rashes. NEURO: No focal deficits. CN II-XII intact. FROM. 5/5 motor and sensory. ~Derek Mckeon PA-C

## 2025-05-07 NOTE — ED STATDOCS - CLINICAL SUMMARY MEDICAL DECISION MAKING FREE TEXT BOX
56 y/o F with PMHx of DM, HTN, pyelonephritis, hysterectomy presents to the ED c/o R shoulder pain radiating down her arm and trouble breathing secondary to pain x3 days. Endorses neck and back pain. Denies recent falls. Pt does not do any heavy lifting, but states she moves a lot of clothing at work as she works at the [employer]. States moving her arm exacerbates her pain. Pt has had similar pain in the past in the same location. Pt taking Tylenol for pain at home.    Ddx: MSK vs cervical radiculopathy. Plan on CXR, XR shoulder, lidocaine patch, Toradol, Flexeril, reassess.

## 2025-05-08 VITALS
TEMPERATURE: 98 F | DIASTOLIC BLOOD PRESSURE: 75 MMHG | SYSTOLIC BLOOD PRESSURE: 110 MMHG | HEART RATE: 65 BPM | OXYGEN SATURATION: 100 % | RESPIRATION RATE: 18 BRPM

## 2025-05-14 ENCOUNTER — APPOINTMENT (OUTPATIENT)
Dept: ORTHOPEDIC SURGERY | Facility: CLINIC | Age: 55
End: 2025-05-14

## 2025-05-15 NOTE — ED STATDOCS - OBJECTIVE STATEMENT
ASQ-3 Screen      Results: Some Concerns/Monitor     Communication: Reassuring Score: 50   Gross Motor: Concerning Score: 20   Fine Motor: Reassuring Score: 50   Problem Solving: Reassuring Score: 45   Personal-Social: Monitoring Score: 30             53 y/o F with PMHx of HTN, DM, pyelonephritis, presents to ED c/o right shoulder pain x2 days. Pt reports inability to move. Pt denies injuries. +CP, +back pain. No abd pain. Pt reports she has been taking advil. Non smoker, no drinker. 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 : [id number] 51 y/o F with PMHx of HTN, DM, pyelonephritis, presents to ED c/o right shoulder pain x2 days. Pt reports difficulty with moving arm. Pt denies injuries. No abd pain. Pt reports she has been taking advil. Non smoker, no drinker. 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 : [id number]

## 2025-05-26 NOTE — ED ADULT TRIAGE NOTE - CHIEF COMPLAINT QUOTE
back pain that started at 12:00. Denies injury or trauma. Initially reporting chest pain, but denies pain now. States that pain is so bad it is 'hard to breathe." No respiratory distress evident. Luana Cardenas